# Patient Record
Sex: FEMALE | Race: WHITE | NOT HISPANIC OR LATINO | Employment: FULL TIME | URBAN - METROPOLITAN AREA
[De-identification: names, ages, dates, MRNs, and addresses within clinical notes are randomized per-mention and may not be internally consistent; named-entity substitution may affect disease eponyms.]

---

## 2017-01-04 NOTE — PROGRESS NOTES
SUBJECTIVE:                                                    Zamzam Willis is a 56 year old female who presents to clinic today for the following health issues:    Plugged ear      Duration: 2 weeks    Description (location/character/radiation): sinus congestion, headache, facial pressure, plugged right ear    Intensity:  moderate    Accompanying signs and symptoms: loss of hearing in right ear    History (similar episodes/previous evaluation): None    Precipitating or alleviating factors: None    Therapies tried and outcome: sudafed and ibuprofen    No fevers, mild cough, no short of breath or wheezing.   Couple years ago had sinusitis.     Problem list and histories reviewed & adjusted, as indicated.  Additional history: as documented    Patient Active Problem List   Diagnosis     Major depressive disorder, single episode     GERD (gastroesophageal reflux disease)     Anxiety     Seasonal affective disorder (H)     Vitamin D deficiency     CARDIOVASCULAR SCREENING; LDL GOAL LESS THAN 160     Folliculitis     SEVERE ZA (obstructive sleep apnea)     Past Surgical History   Procedure Laterality Date     C/section, low transverse       x 3     Tonsillectomy & adenoidectomy       as a child for snoring     Eye surgery  2011     bilateral catarract        Social History   Substance Use Topics     Smoking status: Never Smoker      Smokeless tobacco: Never Used      Comment: nonsmoking household     Alcohol Use: 0.0 oz/week     0 Standard drinks or equivalent per week      Comment: Rarely     Family History   Problem Relation Age of Onset     Arthritis Mother      Blood Disease Father      myelodysplastic syndrome     DIABETES Father      DIABETES Paternal Grandmother      CEREBROVASCULAR DISEASE Father      snored     CEREBROVASCULAR DISEASE Maternal Grandmother      CEREBROVASCULAR DISEASE Maternal Grandfather      CEREBROVASCULAR DISEASE Paternal Grandfather      Depression Mother      ZA     Depression Father       Asthma Sister      Asthma Mother          Current Outpatient Prescriptions   Medication Sig Dispense Refill     fluticasone (FLONASE) 50 MCG/ACT spray Spray 1-2 sprays into both nostrils daily 1 g 11     amoxicillin (AMOXIL) 500 MG capsule Take 2 capsules (1,000 mg) by mouth 3 times daily 60 capsule 0     escitalopram (LEXAPRO) 20 MG tablet Take 1 tablet (20 mg) by mouth daily 90 tablet 1     nystatin-triamcinolone (MYCOLOG II) cream Apply topically 2 times daily (Patient taking differently: Apply topically 2 times daily as needed ) 30 g 6     ORDER FOR DME Res Med S9 auto CPAP9-13 cm H2O, Grant Fx best medium       aspirin-acetaminophen-caffeine (EXCEDRIN MIGRAINE) 250-250-65 MG per tablet Take 1 tablet by mouth every 6 hours as needed for headaches 80 tablet      Ibuprofen (IBU PO) Take  by mouth as needed.       Acetaminophen (ACETAMIN PO) Take  by mouth as needed.       cholecalciferol (VITAMIN D) 1000 UNIT tablet Take 1 tablet by mouth daily.       CALCIUM 600+D 600-400 MG-UNIT OR TABS take one tablet twice a day 180 Tab 3     MULTIVITAMIN OR 1 TABLET DAILY       MAGNESIUM 500 MG OR CAPS 1 TABLET DAILY       Allergies   Allergen Reactions     Sulfa Drugs Hives     Problem list, Medication list, Allergies, and Medical/Social/Surgical histories reviewed in [facility] and updated as appropriate.    ROS:  Constitutional, HEENT, cardiovascular, pulmonary, gi and gu systems are negative, except as otherwise noted.    OBJECTIVE:                                                    /78 mmHg  Pulse 78  Temp(Src) 96.9  F (36.1  C) (Oral)  Wt 264 lb (119.75 kg)  SpO2 96%  LMP 10/12/2012  Body mass index is 43.25 kg/(m^2).  GENERAL: healthy, alert and no distress  EYES: Eyes grossly normal to inspection, PERRL and conjunctivae and sclerae normal  HENT: ear canals and TM's normal, nose and mouth without ulcers or lesions. posterior nasal drainage, nasal congestion,  Maxillary sinus tenderness.    NECK: no  adenopathy, no asymmetry, masses, or scars and thyroid normal to palpation  RESP: lungs clear to auscultation - no rales, rhonchi or wheezes  CV: regular rate and rhythm, normal S1 S2, no S3 or S4, no murmur, click or rub, no peripheral edema and peripheral pulses strong    Diagnostic Test Results:  none      ASSESSMENT/PLAN:                                                        ICD-10-CM    1. Other sinusitis J32.9 fluticasone (FLONASE) 50 MCG/ACT spray     amoxicillin (AMOXIL) 500 MG capsule   2. ETD (eustachian tube dysfunction), right H69.81    Warning signs discussed.  side effects discussed  Symptomatic treatment: such as fluids,  OTC acetaminophen and /or non-steroidal anti-inflammatory medication.  Follow up  1-2 wks as needed     Indra Lewis PA-C  [facility]

## 2017-01-05 ENCOUNTER — OFFICE VISIT (OUTPATIENT)
Dept: FAMILY MEDICINE | Facility: CLINIC | Age: 57
End: 2017-01-05
Payer: COMMERCIAL

## 2017-01-05 VITALS
BODY MASS INDEX: 43.25 KG/M2 | TEMPERATURE: 96.9 F | WEIGHT: 264 LBS | HEART RATE: 78 BPM | SYSTOLIC BLOOD PRESSURE: 113 MMHG | DIASTOLIC BLOOD PRESSURE: 78 MMHG | OXYGEN SATURATION: 96 %

## 2017-01-05 DIAGNOSIS — H69.91 ETD (EUSTACHIAN TUBE DYSFUNCTION), RIGHT: ICD-10-CM

## 2017-01-05 DIAGNOSIS — J32.9 OTHER SINUSITIS: Primary | ICD-10-CM

## 2017-01-05 PROCEDURE — 99213 OFFICE O/P EST LOW 20 MIN: CPT | Performed by: PHYSICIAN ASSISTANT

## 2017-01-05 RX ORDER — FLUTICASONE PROPIONATE 50 MCG
1-2 SPRAY, SUSPENSION (ML) NASAL DAILY
Qty: 1 G | Refills: 11 | Status: SHIPPED | OUTPATIENT
Start: 2017-01-05 | End: 2023-01-04

## 2017-01-05 RX ORDER — AMOXICILLIN 500 MG/1
1000 CAPSULE ORAL 3 TIMES DAILY
Qty: 60 CAPSULE | Refills: 0 | Status: SHIPPED | OUTPATIENT
Start: 2017-01-05 | End: 2017-03-05

## 2017-01-05 NOTE — PATIENT INSTRUCTIONS
Sinusitis           What is sinusitis?   Sinusitis is swollen, infected linings of the sinuses. The sinuses are hollow spaces in the bones of your face and skull. They connect with the nose through small openings. Like the nose, their linings make mucus.   How does it occur?   Sinusitis occurs when the sinus linings become infected. The passageways from the sinuses to the nose are very narrow. Swelling and mucus may block the passageways. This leads to pressure changes in the sinuses that can be painful.   A number of things can cause swelling and sinusitis. Most often it's allergens (things that cause allergies, like pollen and mold) and viruses, such as viruses that cause the common cold. Whether the cause is allergies or a virus, the sinus linings can swell. When swelling causes the sinus passageway to swell shut, bacteria, viruses, and even fungus can be trapped in the sinuses and cause a sinus infection.   If your nasal bones have been injured or are deformed, causing partial blockage of the sinus openings, you are more likely to get sinusitis.   What are the symptoms?   Symptoms include:   feeling of fullness or pressure in your head   a headache that is most painful when you first wake up in the morning or when you bend your head down or forward   pain above or below your eyes   aching in the upper jaw and teeth   runny or stuffy nose   cough, especially at night   fluid draining down the back of your throat (postnasal drainage)   sore throat in the morning or evening.   How is it diagnosed?   Your healthcare provider will ask about your symptoms and will examine you. You may have an X-ray to look for swelling, fluid, or small benign growths (polyps) in the sinuses.   How is it treated?   Decongestants may help. They may be nonprescription or prescription. They are available as liquids, pills, and nose sprays.   Your healthcare provider may prescribe an antibiotic. In some cases you may need to  take decongestants and antibiotics for several weeks.   You may need nonprescription medicine for pain, such as acetaminophen or ibuprofen. Check with your healthcare provider before you give any medicine that contains aspirin or salicylates to a child or teen. This includes medicines like baby aspirin, some cold medicines, and Pepto Bismol. Children and teens who take aspirin are at risk for a serious illness called Reye's syndrome. Ibuprofen is an NSAID. Nonsteroidal anti-inflammatory medicines (NSAIDs) may cause stomach bleeding and other problems. These risks increase with age. Read the label and take as directed. Unless recommended by your healthcare provider, do not take NSAIDs for more than 10 days for any reason.   If you have chronic or repeated sinus infections, allergies may be the cause. Your healthcare provider may prescribe antihistamine tablets or prescription nasal sprays (steroids or cromolyn) to treat the allergies.   If you have chronic, severe sinusitis that does not respond to treatment with medicines, surgery may be done. The surgeon can create an extra or enlarged passageway in the wall of the sinus cavity. This allows the sinuses to drain more easily through the nasal passages. This should help them stay free of infection.   How long will the effects last?   Symptoms may get better gradually over 3 to 10 days. Depending on what caused the sinusitis and how severe it is, it may last for days or weeks. The symptoms may come back if you do not finish all of your antibiotic.   How can I take care of myself?   Follow your healthcare provider's instructions.   If you are taking an antibiotic, take all of it as directed by your provider. If you stop taking the medicine when your symptoms are gone but before you have taken all of the medicine, symptoms may come back.   Avoid tobacco smoke.   If you have allergies, take care to avoid the things you are allergic to, such as animal dander.   Add  moisture to the air with a humidifier or a vaporizer, unless you have mold allergy (mold may grow in your vaporizer).   Inhale steam from a basin of hot water or shower to help open your sinuses and relieve pain.   Use saline nasal sprays to help wash out nasal passages and clear some mucus from the airways.   Use decongestants as directed on the label or by your provider.   If you are using a nonprescription nasal-spray decongestant, generally you should not use it for more than 3 days. After 3 days it may cause your symptoms to get worse. Ask your healthcare provider if it is OK for you to use a nasal spray decongestant longer than this.   Get plenty of rest.   Drink more fluids to keep the mucus as thin as possible so your sinuses can drain more easily.   Put warm compresses on painful areas.   Take antibiotics as prescribed. Use all of the medicine, even after you feel better. Some sinus infections require 2 to 4 weeks of antibiotic treatment.   See your healthcare provider if the pain lasts for several days or gets worse.   If the sinus areas above or below your eyes are swollen or bulging, see your healthcare provider right away. This symptom may mean that the infection is spreading. A spreading infection can affect other parts of your body--even the brain--and needs to be treated promptly.   How can I help prevent sinusitis?   Treat your colds and allergies promptly. Use decongestants as soon as you start having symptoms.   Do not smoke and stay away from secondhand smoke.   Drink lots of fluids to keep the mucus thin.   Humidify your home if the air is particularly dry.   If you have sinus infections often, consider having allergy tests.   If sinusitis continues to be a problem despite treatment, you might need an exam by an ear, nose, and throat doctor (called an ENT or otolaryngologist). The specialist will check for polyps or a deformed bone that may be blocking your sinuses.     Published by  [employer].  This content is reviewed periodically and is subject to change as new health information becomes available. The information is intended to inform and educate and is not a replacement for medical evaluation, advice, diagnosis or treatment by a healthcare professional.   Developed by [employer].   ? 2010 [facility] and/or its affiliates. All Rights Reserved.   Copyright   [employer] 2011  Adult Health Advisor

## 2017-01-05 NOTE — NURSING NOTE
"Chief Complaint   Patient presents with     Ear Problem       Initial /78 mmHg  Pulse 78  Temp(Src) 96.9  F (36.1  C) (Oral)  Wt 264 lb (119.75 kg)  SpO2 96%  LMP 10/12/2012 Estimated body mass index is 43.25 kg/(m^2) as calculated from the following:    Height as of 11/2/16: 5' 5.5\" (1.664 m).    Weight as of this encounter: 264 lb (119.75 kg).  BP completed using cuff size: luzmaria Sauer CMA      "

## 2017-01-05 NOTE — MR AVS SNAPSHOT
After Visit Summary   1/5/2017    Zamzam Willis    MRN: [id number]           Patient Information     Date Of Birth          1960        Visit Information        Provider Department      1/5/2017 7:00 AM Indra Lewis PA-C [facility]        Today's Diagnoses     Other sinusitis    -  1     ETD (eustachian tube dysfunction), right           Care Instructions                  Sinusitis           What is sinusitis?   Sinusitis is swollen, infected linings of the sinuses. The sinuses are hollow spaces in the bones of your face and skull. They connect with the nose through small openings. Like the nose, their linings make mucus.   How does it occur?   Sinusitis occurs when the sinus linings become infected. The passageways from the sinuses to the nose are very narrow. Swelling and mucus may block the passageways. This leads to pressure changes in the sinuses that can be painful.   A number of things can cause swelling and sinusitis. Most often it's allergens (things that cause allergies, like pollen and mold) and viruses, such as viruses that cause the common cold. Whether the cause is allergies or a virus, the sinus linings can swell. When swelling causes the sinus passageway to swell shut, bacteria, viruses, and even fungus can be trapped in the sinuses and cause a sinus infection.   If your nasal bones have been injured or are deformed, causing partial blockage of the sinus openings, you are more likely to get sinusitis.   What are the symptoms?   Symptoms include:   feeling of fullness or pressure in your head   a headache that is most painful when you first wake up in the morning or when you bend your head down or forward   pain above or below your eyes   aching in the upper jaw and teeth   runny or stuffy nose   cough, especially at night   fluid draining down the back of your throat (postnasal drainage)   sore throat in the morning or evening.   How is it diagnosed?   Your  healthcare provider will ask about your symptoms and will examine you. You may have an X-ray to look for swelling, fluid, or small benign growths (polyps) in the sinuses.   How is it treated?   Decongestants may help. They may be nonprescription or prescription. They are available as liquids, pills, and nose sprays.   Your healthcare provider may prescribe an antibiotic. In some cases you may need to take decongestants and antibiotics for several weeks.   You may need nonprescription medicine for pain, such as acetaminophen or ibuprofen. Check with your healthcare provider before you give any medicine that contains aspirin or salicylates to a child or teen. This includes medicines like baby aspirin, some cold medicines, and Pepto Bismol. Children and teens who take aspirin are at risk for a serious illness called Reye's syndrome. Ibuprofen is an NSAID. Nonsteroidal anti-inflammatory medicines (NSAIDs) may cause stomach bleeding and other problems. These risks increase with age. Read the label and take as directed. Unless recommended by your healthcare provider, do not take NSAIDs for more than 10 days for any reason.   If you have chronic or repeated sinus infections, allergies may be the cause. Your healthcare provider may prescribe antihistamine tablets or prescription nasal sprays (steroids or cromolyn) to treat the allergies.   If you have chronic, severe sinusitis that does not respond to treatment with medicines, surgery may be done. The surgeon can create an extra or enlarged passageway in the wall of the sinus cavity. This allows the sinuses to drain more easily through the nasal passages. This should help them stay free of infection.   How long will the effects last?   Symptoms may get better gradually over 3 to 10 days. Depending on what caused the sinusitis and how severe it is, it may last for days or weeks. The symptoms may come back if you do not finish all of your antibiotic.   How can I take care of  myself?   Follow your healthcare provider's instructions.   If you are taking an antibiotic, take all of it as directed by your provider. If you stop taking the medicine when your symptoms are gone but before you have taken all of the medicine, symptoms may come back.   Avoid tobacco smoke.   If you have allergies, take care to avoid the things you are allergic to, such as animal dander.   Add moisture to the air with a humidifier or a vaporizer, unless you have mold allergy (mold may grow in your vaporizer).   Inhale steam from a basin of hot water or shower to help open your sinuses and relieve pain.   Use saline nasal sprays to help wash out nasal passages and clear some mucus from the airways.   Use decongestants as directed on the label or by your provider.   If you are using a nonprescription nasal-spray decongestant, generally you should not use it for more than 3 days. After 3 days it may cause your symptoms to get worse. Ask your healthcare provider if it is OK for you to use a nasal spray decongestant longer than this.   Get plenty of rest.   Drink more fluids to keep the mucus as thin as possible so your sinuses can drain more easily.   Put warm compresses on painful areas.   Take antibiotics as prescribed. Use all of the medicine, even after you feel better. Some sinus infections require 2 to 4 weeks of antibiotic treatment.   See your healthcare provider if the pain lasts for several days or gets worse.   If the sinus areas above or below your eyes are swollen or bulging, see your healthcare provider right away. This symptom may mean that the infection is spreading. A spreading infection can affect other parts of your body--even the brain--and needs to be treated promptly.   How can I help prevent sinusitis?   Treat your colds and allergies promptly. Use decongestants as soon as you start having symptoms.   Do not smoke and stay away from secondhand smoke.   Drink lots of fluids to keep the mucus thin.    Humidify your home if the air is particularly dry.   If you have sinus infections often, consider having allergy tests.   If sinusitis continues to be a problem despite treatment, you might need an exam by an ear, nose, and throat doctor (called an ENT or otolaryngologist). The specialist will check for polyps or a deformed bone that may be blocking your sinuses.     Published by [employer].  This content is reviewed periodically and is subject to change as new health information becomes available. The information is intended to inform and educate and is not a replacement for medical evaluation, advice, diagnosis or treatment by a healthcare professional.   Developed by [employer].   ? 2010 [employer] and/or its affiliates. All Rights Reserved.   Copyright   [employer] 2011  Adult Health Advisor              Follow-ups after your visit        Who to contact     If you have questions or need follow up information about today's clinic visit or your schedule please contact [facility] directly at [phone].  Normal or non-critical lab and imaging results will be communicated to you by [employer], letter or phone within 4 business days after the clinic has received the results. If you do not hear from us within 7 days, please contact the clinic through [employer] or phone. If you have a critical or abnormal lab result, we will notify you by phone as soon as possible.  Submit refill requests through [employer] or call your pharmacy and they will forward the refill request to us. Please allow 3 business days for your refill to be completed.          Additional Information About Your Visit        [employer] Information     [employer] gives you secure access to your electronic health record. If you see a primary care provider, you can also send messages to your care team and make appointments. If you have questions, please call your primary care clinic.  If you do not have a primary care provider,  please call [phone] and they will assist you.        Care EveryWhere ID     This is your Care EveryWhere ID. This could be used by other organizations to access your [place] medical records  [id number]        Your Vitals Were     Pulse Temperature Pulse Oximetry Last Period          78 96.9  F (36.1  C) (Oral) 96% 10/12/2012         Blood Pressure from Last 3 Encounters:   01/05/17 113/78   11/02/16 113/72   09/04/15 118/82    Weight from Last 3 Encounters:   01/05/17 264 lb (119.75 kg)   11/02/16 263 lb (119.296 kg)   09/04/15 263 lb (119.296 kg)              Today, you had the following     No orders found for display         Today's Medication Changes          These changes are accurate as of: 1/5/17  7:24 AM.  If you have any questions, ask your nurse or doctor.               Start taking these medicines.        Dose/Directions    amoxicillin 500 MG capsule   Commonly known as:  AMOXIL   Used for:  Other sinusitis   Started by:  Indra Lewis PA-C        Dose:  1000 mg   Take 2 capsules (1,000 mg) by mouth 3 times daily   Quantity:  60 capsule   Refills:  0       fluticasone 50 MCG/ACT spray   Commonly known as:  FLONASE   Used for:  Other sinusitis   Started by:  Indra Lewis PA-C        Dose:  1-2 spray   Spray 1-2 sprays into both nostrils daily   Quantity:  1 g   Refills:  11         These medicines have changed or have updated prescriptions.        Dose/Directions    nystatin-triamcinolone cream   Commonly known as:  MYCOLOG II   This may have changed:    - when to take this  - reasons to take this   Used for:  Intertriginous candidiasis        Apply topically 2 times daily   Quantity:  30 g   Refills:  6            Where to get your medicines      These medications were sent to [facility] [address] - [address] [facility] AT [address]  [address] [facility] [address]     Phone:  [phone]    - amoxicillin 500 MG capsule  - fluticasone  50 MCG/ACT spray             Primary Care Provider Office Phone # Fax #    Naida Sulema Garrett [phone]       [facility] [address] [facility] [address]        Thank you!     Thank you for choosing [facility]  for your care. Our goal is always to provide you with excellent care. Hearing back from our patients is one way we can continue to improve our services. Please take a few minutes to complete the written survey that you may receive in the mail after your visit with us. Thank you!             Your Updated Medication List - Protect others around you: Learn how to safely use, store and throw away your medicines at www.disposemymeds.org.          This list is accurate as of: 1/5/17  7:24 AM.  Always use your most recent med list.                   Brand Name Dispense Instructions for use    ACETAMIN PO      Take  by mouth as needed.       amoxicillin 500 MG capsule    AMOXIL    60 capsule    Take 2 capsules (1,000 mg) by mouth 3 times daily       aspirin-acetaminophen-caffeine 250-250-65 MG per tablet    EXCEDRIN MIGRAINE    80 tablet    Take 1 tablet by mouth every 6 hours as needed for headaches       calcium 600 + D 600-400 MG-UNIT per tablet   Generic drug:  calcium-vitamin D     180 Tab    take one tablet twice a day       cholecalciferol 1000 UNIT tablet    vitamin D     Take 1 tablet by mouth daily.       escitalopram 20 MG tablet    LEXAPRO    90 tablet    Take 1 tablet (20 mg) by mouth daily       fluticasone 50 MCG/ACT spray    FLONASE    1 g    Spray 1-2 sprays into both nostrils daily       IBU PO      Take  by mouth as needed.       Magnesium 500 MG Caps      1 TABLET DAILY       MULTIVITAMIN PO      1 TABLET DAILY       nystatin-triamcinolone cream    MYCOLOG II    30 g    Apply topically 2 times daily       order for DME      Res Med S9 auto CPAP9-13 cm H2O, [place] Fx best medium

## 2017-02-01 ENCOUNTER — E-VISIT (OUTPATIENT)
Dept: FAMILY MEDICINE | Facility: CLINIC | Age: 57
End: 2017-02-01
Payer: COMMERCIAL

## 2017-02-01 DIAGNOSIS — J32.9 OTHER SINUSITIS: Primary | ICD-10-CM

## 2017-02-01 PROCEDURE — 99444 ZZC PHYSICIAN ONLINE EVALUATION & MANAGEMENT SERVICE: CPT | Performed by: PHYSICIAN ASSISTANT

## 2017-03-05 ENCOUNTER — OFFICE VISIT (OUTPATIENT)
Dept: URGENT CARE | Facility: URGENT CARE | Age: 57
End: 2017-03-05
Payer: COMMERCIAL

## 2017-03-05 VITALS
TEMPERATURE: 98.3 F | DIASTOLIC BLOOD PRESSURE: 76 MMHG | OXYGEN SATURATION: 96 % | HEART RATE: 84 BPM | BODY MASS INDEX: 43.1 KG/M2 | SYSTOLIC BLOOD PRESSURE: 115 MMHG | RESPIRATION RATE: 16 BRPM | WEIGHT: 263 LBS

## 2017-03-05 DIAGNOSIS — J32.9 CHRONIC SINUSITIS, UNSPECIFIED LOCATION: ICD-10-CM

## 2017-03-05 DIAGNOSIS — H65.191 OTHER ACUTE NONSUPPURATIVE OTITIS MEDIA OF RIGHT EAR, RECURRENCE NOT SPECIFIED: Primary | ICD-10-CM

## 2017-03-05 PROCEDURE — 99213 OFFICE O/P EST LOW 20 MIN: CPT | Performed by: FAMILY MEDICINE

## 2017-03-05 RX ORDER — CEFDINIR 300 MG/1
300 CAPSULE ORAL 2 TIMES DAILY
Qty: 20 CAPSULE | Refills: 0 | Status: SHIPPED | OUTPATIENT
Start: 2017-03-05 | End: 2017-03-15

## 2017-03-05 ASSESSMENT — PAIN SCALES - GENERAL: PAINLEVEL: SEVERE PAIN (6)

## 2017-03-05 NOTE — PROGRESS NOTES
SUBJECTIVE:                                                    Zamzam Willis is a 56 year old female who presents to clinic today for the following health issues:      RESPIRATORY SYMPTOMS      Duration: 10 days    Description  nasal congestion, facial pain/pressure, cough, ear pain bilateral and headache    Severity: severe    Accompanying signs and symptoms: None    History (predisposing factors):  none    Precipitating or alleviating factors: None    Therapies tried and outcome: over the counter meds      Has been sick since [place]  Has been on 2 different antibiotic: initially amoxicillin then augmentin last one was in 2/1/17 was augmentin.   Saw ENT was told she had eustachian tube problem was placed on prednisone and is off that now  10 days ago hearing started to lose again like before feeling plugged bilateral side head and sinuses congested.  Having constant postnasal drainage. Cough and greenish nasal discharge   Mouth also feels discomfort and has not taste.   No fevers or chills chest pain or shortness of breath   No rash  Ill-contacts: unsure  Because of persistent and worsening symptoms came in to be seen    Problem list and histories reviewed & adjusted, as indicated.  Additional history: as documented    Problem list, Medication list, Allergies, and Medical/Social/Surgical histories reviewed in [facility] and updated as appropriate.    ROS:  Constitutional, HEENT, cardiovascular, pulmonary, gi and gu systems are negative, except as otherwise noted.    OBJECTIVE:                                                    /76  Pulse 84  Temp 98.3  F (36.8  C) (Oral)  Resp 16  Wt 263 lb (119.3 kg)  LMP 10/12/2012  SpO2 96%  Breastfeeding? No  BMI 43.1 kg/m2  Body mass index is 43.1 kg/(m^2).  GENERAL: healthy, alert and no distress  EYES: pink palpebral conjunctiva, anicteric sclera, pupils equally reactive to light and accomodation, extraocular muscles intact full and equal.  ENT: midline nasal septum,  positive  nasal congestion   Left ear:no tragal tenderness, no mastoid tenderness normal tympaninc membrane   Right ear: no tragal tenderness, no mastoid tenderness erythematous and bulging tympaninc membrane   NECK: no adenopathy, no asymmetry or  masses  RESP: lungs clear to auscultation - no rales, rhonchi or wheezes  CV: regular rate and rhythm, normal S1 S2, no S3 or S4, no murmur, click or rub, no peripheral edema and peripheral pulses strong  ABDOMEN: soft, nontender, no hepatosplenomegaly, no masses and bowel sounds normal  MS: no gross musculoskeletal defects noted, no edema  NEURO: Normal strength and tone, mentation intact and speech normal    Diagnostic Test Results:  No results found for this or any previous visit (from the past 24 hour(s)).     ASSESSMENT/PLAN:                                                        ICD-10-CM    1. Other acute nonsuppurative otitis media of right ear, recurrence not specified H65.191 cefdinir (OMNICEF) 300 MG capsule   2. Chronic sinusitis, unspecified location J32.9        Prescribed with omnicef  Follow up with primary care provider for chronic sinusitis. Recommended flonase.   Recommend follow up with primary care provider if no relief , sooner if worse  Needs ear recheck with primary care provider in 2-4 weeks  Adverse reactions of medications discussed.  Over the counter medications discussed.   Aware to come back in if with worsening symptoms or if no relief despite treatment plan  Patient voiced understanding and had no further questions.     MD Sherly Vital MD  [facility]

## 2017-03-05 NOTE — MR AVS SNAPSHOT
After Visit Summary   3/5/2017    Zamzam Willis    MRN: [id number]           Patient Information     Date Of Birth          1960        Visit Information        Provider Department      3/5/2017 10:15 AM Sherly Barbour MD [facility]        Today's Diagnoses     Other acute nonsuppurative otitis media of right ear, recurrence not specified    -  1    Chronic sinusitis, unspecified location           Follow-ups after your visit        Who to contact     If you have questions or need follow up information about today's clinic visit or your schedule please contact [facility] directly at [phone].  Normal or non-critical lab and imaging results will be communicated to you by MyChart, letter or phone within 4 business days after the clinic has received the results. If you do not hear from us within 7 days, please contact the clinic through [employer] or phone. If you have a critical or abnormal lab result, we will notify you by phone as soon as possible.  Submit refill requests through [employer] or call your pharmacy and they will forward the refill request to us. Please allow 3 business days for your refill to be completed.          Additional Information About Your Visit        MyChart Information     [employer] gives you secure access to your electronic health record. If you see a primary care provider, you can also send messages to your care team and make appointments. If you have questions, please call your primary care clinic.  If you do not have a primary care provider, please call [phone] and they will assist you.        Care EveryWhere ID     This is your Care EveryWhere ID. This could be used by other organizations to access your [facility] medical records  [id number]        Your Vitals Were     Pulse Temperature Respirations Last Period Pulse Oximetry Breastfeeding?    84 98.3  F (36.8  C) (Oral) 16 10/12/2012 96% No    BMI (Body Mass Index)                    43.1 kg/m2            Blood Pressure from Last 3 Encounters:   03/05/17 115/76   01/05/17 113/78   11/02/16 113/72    Weight from Last 3 Encounters:   03/05/17 263 lb (119.3 kg)   01/05/17 264 lb (119.7 kg)   11/02/16 263 lb (119.3 kg)              Today, you had the following     No orders found for display         Today's Medication Changes          These changes are accurate as of: 3/5/17  9:30 PM.  If you have any questions, ask your nurse or doctor.               Start taking these medicines.        Dose/Directions    cefdinir 300 MG capsule   Commonly known as:  OMNICEF   Used for:  Other acute nonsuppurative otitis media of right ear, recurrence not specified   Started by:  Sherly Barbour MD        Dose:  300 mg   Take 1 capsule (300 mg) by mouth 2 times daily for 10 days   Quantity:  20 capsule   Refills:  0            Where to get your medicines      These medications were sent to [id number] - [place] [address] [place] AT [address] [place], [place] [facility] [place] [address]     Phone:  [phone]     cefdinir 300 MG capsule                Primary Care Provider Office Phone # Fax #    Naida Garrett [phone]       [facility] [address] [facility] [address]        Thank you!     Thank you for choosing [facility]  for your care. Our goal is always to provide you with excellent care. Hearing back from our patients is one way we can continue to improve our services. Please take a few minutes to complete the written survey that you may receive in the mail after your visit with us. Thank you!             Your Updated Medication List - Protect others around you: Learn how to safely use, store and throw away your medicines at www.disposemymeds.org.          This list is accurate as of: 3/5/17  9:30 PM.  Always use your most recent med list.                   Brand Name Dispense  Instructions for use    ACETAMIN PO      Take  by mouth as needed.       aspirin-acetaminophen-caffeine 250-250-65 MG per tablet    EXCEDRIN MIGRAINE    80 tablet    Take 1 tablet by mouth every 6 hours as needed for headaches       calcium 600 + D 600-400 MG-UNIT per tablet   Generic drug:  calcium-vitamin D     180 Tab    take one tablet twice a day       cefdinir 300 MG capsule    OMNICEF    20 capsule    Take 1 capsule (300 mg) by mouth 2 times daily for 10 days       cholecalciferol 1000 UNIT tablet    vitamin D     Take 1 tablet by mouth daily.       escitalopram 20 MG tablet    LEXAPRO    90 tablet    Take 1 tablet (20 mg) by mouth daily       fluticasone 50 MCG/ACT spray    FLONASE    1 g    Spray 1-2 sprays into both nostrils daily       IBU PO      Take  by mouth as needed.       Magnesium 500 MG Caps      1 TABLET DAILY       MULTIVITAMIN PO      1 TABLET DAILY       omeprazole 20 MG CR capsule    priLOSEC    90 capsule    Take 1 capsule (20 mg) by mouth daily       order for DME      Res Med S9 auto CPAP9-13 cm H2O, [place] Fx best medium

## 2017-04-25 NOTE — PROGRESS NOTES
SUBJECTIVE:     CC: Zamzam Willis is an 56 year old woman who presents for preventive health visit.     Healthy Habits:    Do you get at least three servings of calcium containing foods daily (dairy, green leafy vegetables, etc.)? yes    Amount of exercise or daily activities, outside of work: 1 day(s) per week    Problems taking medications regularly No    Medication side effects: No    Have you had an eye exam in the past two years? yes    Do you see a dentist twice per year? no    Do you have sleep apnea, excessive snoring or daytime drowsiness?has CPAP     Feeling a little more down lately. Not sure if this is due to winter months or getting a different generic Lexapro (changed  in November).    Rash in groin - she has had this before and was given a topical antifungal in the past. She describes it as being red and burning. She is requesting a refill of the antifungal cream.    Skin tags about 20 on neck - some of the bigger ones catch on her clothes, but overall not bothersome.    She also got a bilateral myringotomy back in March due to hearing problems from fluid. She has had no further problems and reports her hearing is back to normal.    Today's PHQ-2 Score:   PHQ-2 ( 1999 Pfizer) 4/29/2017 9/4/2015   Q1: Little interest or pleasure in doing things - 0   Q2: Feeling down, depressed or hopeless - 0   PHQ-2 Score - 0   Little interest or pleasure in doing things Several days -   Feeling down, depressed or hopeless Several days -   PHQ-2 Score 2 -       Abuse: Current or Past(Physical, Sexual or Emotional)- No  Do you feel safe in your environment - Yes    Social History   Substance Use Topics     Smoking status: Never Smoker     Smokeless tobacco: Never Used      Comment: nonsmoking household     Alcohol use 0.0 oz/week      Comment: Rarely     The patient does not drink >3 drinks per day nor >7 drinks per week.      Reviewed orders with patient.  Reviewed health maintenance and updated  orders accordingly - Yes    G 3 P 3   Patient's last menstrual period was 10/12/2012.     Fasting: No   Td: tdap 3/10       Last 3 Pap Results:   PAP (no units)   Date Value   06/10/2014 NIL   2010 NIL        HPV: unknown          Cholesterol:   Recent Labs   Lab Test  14   0939  03/03/10   0956   CHOL  155  149   HDL  29*  28*   LDL  110  108   TRIG  80  66   CHOLHDLRATIO  5.4*  5.4*       MM/16  Dexa:  NA     Flex/colo: FIT       Seat Belt: Yes    Sunscreen use: Yes   Calcium Intake: 3-4 servings daily + supplement   Health Care Directive: No  Sexually Active: Yes     Current contraception: vasectomy  History of abnormal Pap smear: No  Family history of colon/breast/ovarian cancer: No  Regular self breast exam: Yes  History of abnormal mammogram: Yes: f/u benign      Mammo Decision Support:  Patient over age 50, mutual decision to screen reflected in health maintenance.    Pertinent mammograms are reviewed under the imaging tab.      Reviewed and updated as needed this visit by clinical staff  Tobacco  Allergies  Meds         Reviewed and updated as needed this visit by Provider            ROS:  C: NEGATIVE for fever, chills, change in weight  I: NEGATIVE for worrisome rashes, moles or lesions  E: NEGATIVE for vision changes or irritation  ENT: NEGATIVE for ear, mouth and throat problems  R: NEGATIVE for significant cough or SOB  B: NEGATIVE for masses, tenderness or discharge  CV: NEGATIVE for chest pain, palpitations or peripheral edema  GI: NEGATIVE for nausea, abdominal pain, heartburn, or change in bowel habits  : NEGATIVE for unusual urinary or vaginal symptoms. No vaginal bleeding.  M: NEGATIVE for significant arthralgias or myalgia  N: NEGATIVE for weakness, dizziness or paresthesias  P: NEGATIVE for changes in mood or affect     Problem list, Medication list, Allergies, and Medical/Social/Surgical histories reviewed in [facility] and updated as appropriate.  Labs reviewed in EPIC  BP  Readings from Last 3 Encounters:   05/02/17 112/75   03/05/17 115/76   01/05/17 113/78    Wt Readings from Last 3 Encounters:   05/02/17 265 lb (120.2 kg)   03/05/17 263 lb (119.3 kg)   01/05/17 264 lb (119.7 kg)                  Patient Active Problem List   Diagnosis     Major depressive disorder, single episode     GERD (gastroesophageal reflux disease)     Anxiety     Seasonal affective disorder (H)     Vitamin D deficiency     CARDIOVASCULAR SCREENING; LDL GOAL LESS THAN 160     Folliculitis     SEVERE ZA (obstructive sleep apnea)     Past Surgical History:   Procedure Laterality Date     ABDOMEN SURGERY  1981,1983.1985    3 c-sections     BIOPSY      On chest area     C/SECTION, LOW TRANSVERSE      x 3     EYE SURGERY  2011    bilateral catarract      TONSILLECTOMY & ADENOIDECTOMY      as a child for snoring       Social History   Substance Use Topics     Smoking status: Never Smoker     Smokeless tobacco: Never Used      Comment: nonsmoking household     Alcohol use 0.0 oz/week      Comment: Rarely     Family History   Problem Relation Age of Onset     Arthritis Mother      Depression Mother      ZA     Asthma Mother      Anxiety Disorder Mother      Blood Disease Father      myelodysplastic syndrome     DIABETES Father      CEREBROVASCULAR DISEASE Father      snored     Depression Father      DIABETES Paternal Grandmother      CEREBROVASCULAR DISEASE Maternal Grandmother      CEREBROVASCULAR DISEASE Maternal Grandfather      CEREBROVASCULAR DISEASE Paternal Grandfather      Asthma Sister          Current Outpatient Prescriptions   Medication Sig Dispense Refill     escitalopram (LEXAPRO) 20 MG tablet Take 1 tablet (20 mg) by mouth daily 90 tablet 1     omeprazole (PRILOSEC) 20 MG CR capsule Take 1 capsule (20 mg) by mouth daily 90 capsule 3     nystatin-triamcinolone (MYCOLOG II) cream Apply topically 2 times daily as needed 60 g 1     fluticasone (FLONASE) 50 MCG/ACT spray Spray 1-2 sprays into both  "nostrils daily 1 g 11     ORDER FOR DME Res Med S9 auto CPAP9-13 cm H2O, [place] Fx best medium       aspirin-acetaminophen-caffeine (EXCEDRIN MIGRAINE) 250-250-65 MG per tablet Take 1 tablet by mouth every 6 hours as needed for headaches 80 tablet      Ibuprofen (IBU PO) Take  by mouth as needed.       Acetaminophen (ACETAMIN PO) Take  by mouth as needed.       cholecalciferol (VITAMIN D) 1000 UNIT tablet Take 1 tablet by mouth daily.       CALCIUM 600+D 600-400 MG-UNIT OR TABS take one tablet twice a day 180 Tab 3     MULTIVITAMIN OR 1 TABLET DAILY       MAGNESIUM 500 MG OR CAPS 1 TABLET DAILY       OBJECTIVE:     /75  Pulse 75  Temp 97.8  F (36.6  C) (Oral)  Ht 5' 5.35\" (1.66 m)  Wt 265 lb (120.2 kg)  LMP 10/12/2012  BMI 43.62 kg/m2  EXAM:  GENERAL APPEARANCE: healthy, alert and no distress  EYES: Eyes grossly normal to inspection, PERRL and conjunctivae and sclerae normal  HENT: Right TM has small perforation still evident, appears to be healing. Left TM has healed. Ear canals normal, nose and mouth without ulcers or lesions, oropharynx clear and oral mucous membranes moist  NECK: no adenopathy, no asymmetry, masses, or scars and thyroid normal to palpation  RESP: lungs clear to auscultation - no rales, rhonchi or wheezes  BREAST: normal without masses, tenderness or nipple discharge and no palpable axillary masses or adenopathy  CV: regular rate and rhythm, normal S1 S2, no S3 or S4, no murmur, click or rub, no peripheral edema and peripheral pulses strong  ABDOMEN: soft, nontender, no hepatosplenomegaly, no masses and bowel sounds normal   (female): normal female external genitalia, normal urethral meatus, vaginal mucosal atrophy noted, normal cervix, adnexae, and uterus without masses or abnormal discharge  MS: no musculoskeletal defects are noted and gait is age appropriate without ataxia  SKIN: Several small skin tags around neck and chest. Single seborrheic keratosis under left breast.  NEURO: " Normal strength and tone, sensory exam grossly normal, mentation intact and speech normal  PSYCH: mentation appears normal and affect normal/bright    ASSESSMENT/PLAN:     (Z00.00) Routine general medical examination at a health care facility  (primary encounter diagnosis)  Comment: Doing well  Plan: Pap imaged thin layer screen with HPV -         recommended age 30 - 65 years (select HPV order        below), HPV High Risk Types DNA Cervical    (F39) Seasonal affective disorder (H)  Comment: Stable  Plan: escitalopram (LEXAPRO) 20 MG tablet        See below    (F32.5) Major depressive disorder with single episode, in full remission (H)  Comment: Stable  Plan: escitalopram (LEXAPRO) 20 MG tablet        Patient has been feeling a little more down lately. It is uncertain if this is due to a  change to her Lexapro in November or the winter season. The patient is comfortable waiting to see if her mood improves with the nicer weather. She was also informed that she can check with her pharmacy and ask to be given the previous medication that she was on in October.    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    (K21.9) Gastroesophageal reflux disease without esophagitis  Comment: Stable  Plan: omeprazole (PRILOSEC) 20 MG CR capsule        Patient has been using the omeprazole OTC almost every day to control her GERD. Advised that she begin to cut back to every other day as tolerated to avoid long term adverse effects and also implement more lifestyle changes (handout provided).    (Z11.9) Screening examination for infectious disease  Comment: Due  Plan: Hepatitis C antibody        Patient will return to clinic for blood draw when she has more time.      COUNSELING:   Reviewed preventive health counseling, as reflected in patient instructions  Special attention given to:         "Regular exercise       Healthy diet/nutrition         reports that she has never smoked. She has never used smokeless tobacco.    Estimated body mass index is 43.62 kg/(m^2) as calculated from the following:    Height as of this encounter: 5' 5.35\" (1.66 m).    Weight as of this encounter: 265 lb (120.2 kg).   Weight management plan: Discussed healthy diet and exercise guidelines and patient will follow up in 6 months in clinic to re-evaluate.    Counseling Resources:  ATP IV Guidelines  Pooled Cohorts Equation Calculator  Breast Cancer Risk Calculator  FRAX Risk Assessment  ICSI Preventive Guidelines  Dietary Guidelines for Americans, 2010  USDA's MyPlate  ASA Prophylaxis  Lung CA Screening    Naida Garrett MD  [facility]  "

## 2017-04-25 NOTE — PATIENT INSTRUCTIONS
Heartburn/GERD   What is heartburn?   Heartburn refers to the symptoms you feel when acids in your stomach flow back into the esophagus. (The esophagus is the tube that carries food from your throat to your stomach.) This backward movement of stomach acid is called reflux. The acid can burn and irritate the esophagus, throat, and vocal cords.   Heartburn is a common problem. Despite its name, it has nothing to do with the heart.   When you have heartburn often, you may have a condition called gastroesophageal reflux disease, or GERD.   How does it occur?   At the bottom of the esophagus there is a ring of muscle called a sphincter. It acts like a valve. When you swallow food, the sphincter opens to let the food pass into the stomach. The ring then closes to keep the stomach contents from going back into the esophagus. If the sphincter is weak or too relaxed, stomach acid and food flow backward into the esophagus. Because the esophagus does not have the protective lining that the stomach has, the acid causes pain.   The sphincter muscle sometimes does not work properly if:   You are overweight.   You are pregnant.   You have a hiatal hernia (a condition in which part of the stomach protrudes through the diaphragm into the chest).   You eat too much.   You lie down soon after eating.   You wear tight clothes that push on your stomach.   Foods that may make heartburn worse are:   foods high in fat   sugar   chocolate   peppermint   onions   citrus foods such as orange juice   tomato-based foods   spicy foods   coffee and other drinks with caffeine, such as tea and cheo   alcohol.   Heartburn can also be made worse by:   taking certain medicines, such as aspirin   smoking cigarettes.   Anyone can have an attack of heartburn from overeating or eating foods that are high in acid. Most of the time heartburn is mild and lasts for a short time. There is usually not a problem when heartburn occurs just once in a  while. You should see your healthcare provider if:   You have heartburn nearly every day for 2 weeks.   The heartburn comes back when the antacid wears off.   Heartburn wakes you up at night.   What are the symptoms?   The main symptom of heartburn is a burning pain in the lower chest, usually close to the bottom of the breastbone. Other symptoms you may have are:   acid or sour taste in your mouth   belching   a feeling of bloating or fullness in the stomach.   These symptoms tend to happen after very large meals and especially with activity such as bending or lifting after meals. The symptoms may be made worse by lying down or by wearing tight clothing.   Heartburn is very common during the last few months of pregnancy. The weight of the baby pushes on the stomach and can cause the sphincter to relax and let acid to flow back into the esophagus.   How is it diagnosed?   Usually heartburn can be diagnosed from your medical history.   If there is any question about the diagnosis, you may have the following tests to check for ulcers or other problems that might cause your symptoms:   barium swallow X-ray study of the esophagus   complete upper GI (gastrointestinal) barium X-ray study of the esophagus, stomach, and upper intestine   endoscopy, a procedure in which a thin flexible tube with a tiny camera is placed in your mouth and down into your stomach so your provider can see your esophagus and stomach.   How is it treated?   To help reduce the symptoms of heartburn you can:   Try not to put a lot of pressure on the sphincter muscle. Eating light meals and wearing loose clothing will help.   Lose weight if you are overweight.   Take nonprescription antacids (tablets or liquid) after meals and at bedtime.   Raise the head of your bed or use more than one pillow so your head is higher than your stomach. This may allow gravity to help keep food from backing up.   If you find that certain foods or drinks seem to cause  your symptoms or make them worse, avoid those foods.   If the simple measures described above do not relieve the symptoms, your healthcare provider may prescribe medicine. The prescription medicines help reduce stomach acid. They also help stomach emptying. A very few people who are not helped with medicines may need surgery.   Get emergency care if the following symptoms occur with the heartburn and do not go away within 15 minutes of treatment for heartburn: shortness of breath; sweating; light-headedness, weakness; or jaw, arm, back, or chest pain.   How long will the effects last?   Heartburn symptoms are usually relieved by treatment in just a few hours or less. If you are having heartburn every day, starting treatment will usually relieve the symptoms in a few days. However, the symptoms may come back from time to time, especially if you gain weight.   Heartburn can sometimes make asthma worse. If you have asthma, preventing or controlling heartburn may help control your asthma symptoms.   How can I help prevent heartburn?   The best prevention is to:   Keep a healthy weight. Lose weight if you are overweight.   Sleep with your head elevated at least 4 to 6 inches. (It's usually most comfortable to put the head of your bed on blocks.)   It may also help if you:   Wait an hour or longer after eating before you lie down. If you have to lie down after a meal, lie on your left side. Keep your head and shoulders slightly higher than the rest of your body. It's best to not eat for 2 to 3 hours before you go to bed.   Eat smaller, more frequent meals.   Avoid wearing tight clothing or belts.   Don't smoke. Smoking relaxes the sphincter leading to your stomach.   Avoid foods and other things that seem to cause heartburn or make it worse.   Developed by [employer].   Published by [employer].   Last modified: 2009-01-14   Last reviewed: 2008-12-02     Preventive Health Recommendations  Female Ages 50 - 64    Yearly  exam: See your health care provider every year in order to  o Review health changes.   o Discuss preventive care.    o Review your medicines if your doctor has prescribed any.      Get a Pap test every three years (unless you have an abnormal result and your provider advises testing more often).    If you get Pap tests with HPV test, you only need to test every 5 years, unless you have an abnormal result.     You do not need a Pap test if your uterus was removed (hysterectomy) and you have not had cancer.    You should be tested each year for STDs (sexually transmitted diseases) if you're at risk.     Have a mammogram every 1 to 2 years.    Have a colonoscopy at age 50, or have a yearly FIT test (stool test). These exams screen for colon cancer.      Have a cholesterol test every 5 years, or more often if advised.    Have a diabetes test (fasting glucose) every three years. If you are at risk for diabetes, you should have this test more often.     If you are at risk for osteoporosis (brittle bone disease), think about having a bone density scan (DEXA).    Shots: Get a flu shot each year. Get a tetanus shot every 10 years.    Nutrition:     Eat at least 5 servings of fruits and vegetables each day.    Eat whole-grain bread, whole-wheat pasta and brown rice instead of white grains and rice.    Talk to your provider about Calcium and Vitamin D.     Lifestyle    Exercise at least 150 minutes a week (30 minutes a day, 5 days a week). This will help you control your weight and prevent disease.    Limit alcohol to one drink per day.    No smoking.     Wear sunscreen to prevent skin cancer.     See your dentist every six months for an exam and cleaning.    See your eye doctor every 1 to 2 years.

## 2017-05-02 ENCOUNTER — DOCUMENTATION ONLY (OUTPATIENT)
Dept: LAB | Facility: CLINIC | Age: 57
End: 2017-05-02

## 2017-05-02 ENCOUNTER — OFFICE VISIT (OUTPATIENT)
Dept: FAMILY MEDICINE | Facility: CLINIC | Age: 57
End: 2017-05-02
Payer: COMMERCIAL

## 2017-05-02 VITALS
DIASTOLIC BLOOD PRESSURE: 75 MMHG | WEIGHT: 265 LBS | SYSTOLIC BLOOD PRESSURE: 112 MMHG | BODY MASS INDEX: 44.15 KG/M2 | HEIGHT: 65 IN | HEART RATE: 75 BPM | TEMPERATURE: 97.8 F

## 2017-05-02 DIAGNOSIS — Z00.00 ROUTINE GENERAL MEDICAL EXAMINATION AT A HEALTH CARE FACILITY: Primary | ICD-10-CM

## 2017-05-02 DIAGNOSIS — K21.9 GASTROESOPHAGEAL REFLUX DISEASE WITHOUT ESOPHAGITIS: ICD-10-CM

## 2017-05-02 DIAGNOSIS — Z11.9 SCREENING EXAMINATION FOR INFECTIOUS DISEASE: ICD-10-CM

## 2017-05-02 DIAGNOSIS — F33.8 SEASONAL AFFECTIVE DISORDER (H): ICD-10-CM

## 2017-05-02 DIAGNOSIS — F32.5 MAJOR DEPRESSIVE DISORDER WITH SINGLE EPISODE, IN FULL REMISSION (H): ICD-10-CM

## 2017-05-02 DIAGNOSIS — B37.2 INTERTRIGINOUS CANDIDIASIS: ICD-10-CM

## 2017-05-02 PROCEDURE — 87624 HPV HI-RISK TYP POOLED RSLT: CPT | Performed by: FAMILY MEDICINE

## 2017-05-02 PROCEDURE — 99396 PREV VISIT EST AGE 40-64: CPT | Performed by: FAMILY MEDICINE

## 2017-05-02 PROCEDURE — G0145 SCR C/V CYTO,THINLAYER,RESCR: HCPCS | Performed by: FAMILY MEDICINE

## 2017-05-02 RX ORDER — NYSTATIN AND TRIAMCINOLONE ACETONIDE 100000; 1 [USP'U]/G; MG/G
CREAM TOPICAL 2 TIMES DAILY PRN
Qty: 60 G | Refills: 1 | Status: SHIPPED | OUTPATIENT
Start: 2017-05-02 | End: 2018-08-21

## 2017-05-02 RX ORDER — ESCITALOPRAM OXALATE 20 MG/1
20 TABLET ORAL DAILY
Qty: 90 TABLET | Refills: 1 | Status: SHIPPED | OUTPATIENT
Start: 2017-05-02 | End: 2017-10-26

## 2017-05-02 NOTE — NURSING NOTE
"Chief Complaint   Patient presents with     Physical       Initial /75  Pulse 75  Temp 97.8  F (36.6  C) (Oral)  Ht 5' 5.35\" (1.66 m)  Wt 265 lb (120.2 kg)  LMP 10/12/2012  BMI 43.62 kg/m2 Estimated body mass index is 43.62 kg/(m^2) as calculated from the following:    Height as of this encounter: 5' 5.35\" (1.66 m).    Weight as of this encounter: 265 lb (120.2 kg).  Medication Reconciliation: complete  Starla Ch , ANA LAURA     "

## 2017-05-02 NOTE — PROGRESS NOTES
Your patient did not come to the lab for the hepC test. I pended the order please sign if you want this test held as a future test.  Thanks,  Karin

## 2017-05-02 NOTE — MR AVS SNAPSHOT
After Visit Summary   5/2/2017    Zamzam Willis    MRN: [id number]           Patient Information     Date Of Birth          1960        Visit Information        Provider Department      5/2/2017 7:35 AM Naida Garrett MD [facility]        Today's Diagnoses     Routine general medical examination at a health care facility    -  1    Seasonal affective disorder (H)        Major depressive disorder with single episode, in full remission (H)        Intertriginous candidiasis        Gastroesophageal reflux disease without esophagitis        Screening examination for infectious disease          Care Instructions                Heartburn/GERD   What is heartburn?   Heartburn refers to the symptoms you feel when acids in your stomach flow back into the esophagus. (The esophagus is the tube that carries food from your throat to your stomach.) This backward movement of stomach acid is called reflux. The acid can burn and irritate the esophagus, throat, and vocal cords.   Heartburn is a common problem. Despite its name, it has nothing to do with the heart.   When you have heartburn often, you may have a condition called gastroesophageal reflux disease, or GERD.   How does it occur?   At the bottom of the esophagus there is a ring of muscle called a sphincter. It acts like a valve. When you swallow food, the sphincter opens to let the food pass into the stomach. The ring then closes to keep the stomach contents from going back into the esophagus. If the sphincter is weak or too relaxed, stomach acid and food flow backward into the esophagus. Because the esophagus does not have the protective lining that the stomach has, the acid causes pain.   The sphincter muscle sometimes does not work properly if:   You are overweight.   You are pregnant.   You have a hiatal hernia (a condition in which part of the stomach protrudes through the diaphragm into the chest).   You eat too much.   You lie  down soon after eating.   You wear tight clothes that push on your stomach.   Foods that may make heartburn worse are:   foods high in fat   sugar   chocolate   peppermint   onions   citrus foods such as orange juice   tomato-based foods   spicy foods   coffee and other drinks with caffeine, such as tea and cheo   alcohol.   Heartburn can also be made worse by:   taking certain medicines, such as aspirin   smoking cigarettes.   Anyone can have an attack of heartburn from overeating or eating foods that are high in acid. Most of the time heartburn is mild and lasts for a short time. There is usually not a problem when heartburn occurs just once in a while. You should see your healthcare provider if:   You have heartburn nearly every day for 2 weeks.   The heartburn comes back when the antacid wears off.   Heartburn wakes you up at night.   What are the symptoms?   The main symptom of heartburn is a burning pain in the lower chest, usually close to the bottom of the breastbone. Other symptoms you may have are:   acid or sour taste in your mouth   belching   a feeling of bloating or fullness in the stomach.   These symptoms tend to happen after very large meals and especially with activity such as bending or lifting after meals. The symptoms may be made worse by lying down or by wearing tight clothing.   Heartburn is very common during the last few months of pregnancy. The weight of the baby pushes on the stomach and can cause the sphincter to relax and let acid to flow back into the esophagus.   How is it diagnosed?   Usually heartburn can be diagnosed from your medical history.   If there is any question about the diagnosis, you may have the following tests to check for ulcers or other problems that might cause your symptoms:   barium swallow X-ray study of the esophagus   complete upper GI (gastrointestinal) barium X-ray study of the esophagus, stomach, and upper intestine   endoscopy, a procedure in which a thin  flexible tube with a tiny camera is placed in your mouth and down into your stomach so your provider can see your esophagus and stomach.   How is it treated?   To help reduce the symptoms of heartburn you can:   Try not to put a lot of pressure on the sphincter muscle. Eating light meals and wearing loose clothing will help.   Lose weight if you are overweight.   Take nonprescription antacids (tablets or liquid) after meals and at bedtime.   Raise the head of your bed or use more than one pillow so your head is higher than your stomach. This may allow gravity to help keep food from backing up.   If you find that certain foods or drinks seem to cause your symptoms or make them worse, avoid those foods.   If the simple measures described above do not relieve the symptoms, your healthcare provider may prescribe medicine. The prescription medicines help reduce stomach acid. They also help stomach emptying. A very few people who are not helped with medicines may need surgery.   Get emergency care if the following symptoms occur with the heartburn and do not go away within 15 minutes of treatment for heartburn: shortness of breath; sweating; light-headedness, weakness; or jaw, arm, back, or chest pain.   How long will the effects last?   Heartburn symptoms are usually relieved by treatment in just a few hours or less. If you are having heartburn every day, starting treatment will usually relieve the symptoms in a few days. However, the symptoms may come back from time to time, especially if you gain weight.   Heartburn can sometimes make asthma worse. If you have asthma, preventing or controlling heartburn may help control your asthma symptoms.   How can I help prevent heartburn?   The best prevention is to:   Keep a healthy weight. Lose weight if you are overweight.   Sleep with your head elevated at least 4 to 6 inches. (It's usually most comfortable to put the head of your bed on blocks.)   It may also help if you:    Wait an hour or longer after eating before you lie down. If you have to lie down after a meal, lie on your left side. Keep your head and shoulders slightly higher than the rest of your body. It's best to not eat for 2 to 3 hours before you go to bed.   Eat smaller, more frequent meals.   Avoid wearing tight clothing or belts.   Don't smoke. Smoking relaxes the sphincter leading to your stomach.   Avoid foods and other things that seem to cause heartburn or make it worse.   Developed by [employer].   Published by [employer].   Last modified: 2009-01-14   Last reviewed: 2008-12-02     Preventive Health Recommendations  Female Ages 50 - 64    Yearly exam: See your health care provider every year in order to  o Review health changes.   o Discuss preventive care.    o Review your medicines if your doctor has prescribed any.      Get a Pap test every three years (unless you have an abnormal result and your provider advises testing more often).    If you get Pap tests with HPV test, you only need to test every 5 years, unless you have an abnormal result.     You do not need a Pap test if your uterus was removed (hysterectomy) and you have not had cancer.    You should be tested each year for STDs (sexually transmitted diseases) if you're at risk.     Have a mammogram every 1 to 2 years.    Have a colonoscopy at age 50, or have a yearly FIT test (stool test). These exams screen for colon cancer.      Have a cholesterol test every 5 years, or more often if advised.    Have a diabetes test (fasting glucose) every three years. If you are at risk for diabetes, you should have this test more often.     If you are at risk for osteoporosis (brittle bone disease), think about having a bone density scan (DEXA).    Shots: Get a flu shot each year. Get a tetanus shot every 10 years.    Nutrition:     Eat at least 5 servings of fruits and vegetables each day.    Eat whole-grain bread, whole-wheat pasta and brown rice instead of  "white grains and rice.    Talk to your provider about Calcium and Vitamin D.     Lifestyle    Exercise at least 150 minutes a week (30 minutes a day, 5 days a week). This will help you control your weight and prevent disease.    Limit alcohol to one drink per day.    No smoking.     Wear sunscreen to prevent skin cancer.     See your dentist every six months for an exam and cleaning.    See your eye doctor every 1 to 2 years.          Follow-ups after your visit        Who to contact     If you have questions or need follow up information about today's clinic visit or your schedule please contact [facility] directly at [phone].  Normal or non-critical lab and imaging results will be communicated to you by [employer], letter or phone within 4 business days after the clinic has received the results. If you do not hear from us within 7 days, please contact the clinic through [email or web address] or phone. If you have a critical or abnormal lab result, we will notify you by phone as soon as possible.  Submit refill requests through [employer] or call your pharmacy and they will forward the refill request to us. Please allow 3 business days for your refill to be completed.          Additional Information About Your Visit        [employer] Information     [employer] gives you secure access to your electronic health record. If you see a primary care provider, you can also send messages to your care team and make appointments. If you have questions, please call your primary care clinic.  If you do not have a primary care provider, please call [phone] and they will assist you.        Care EveryWhere ID     This is your Care EveryWhere ID. This could be used by other organizations to access your [facility] medical records  [id number]        Your Vitals Were     Pulse Temperature Height Last Period BMI (Body Mass Index)       75 97.8  F (36.6  C) (Oral) 5' 5.35\" (1.66 m) 10/12/2012 43.62 kg/m2        Blood Pressure from Last 3 " Encounters:   05/02/17 112/75   03/05/17 115/76   01/05/17 113/78    Weight from Last 3 Encounters:   05/02/17 265 lb (120.2 kg)   03/05/17 263 lb (119.3 kg)   01/05/17 264 lb (119.7 kg)              We Performed the Following     Hepatitis C antibody     HPV High Risk Types DNA Cervical     Pap imaged thin layer screen with HPV - recommended age 30 - 65 years (select HPV order below)          Today's Medication Changes          These changes are accurate as of: 5/2/17  8:21 AM.  If you have any questions, ask your nurse or doctor.               Start taking these medicines.        Dose/Directions    nystatin-triamcinolone cream   Commonly known as:  MYCOLOG II   Used for:  Intertriginous candidiasis   Started by:  Naida Garrett MD        Apply topically 2 times daily as needed   Quantity:  60 g   Refills:  1            Where to get your medicines      These medications were sent to [facility] [address] [place] [address] [facility] AT [address]  [address] [facility] [address]     Phone:  [phone]     escitalopram 20 MG tablet    nystatin-triamcinolone cream         Some of these will need a paper prescription and others can be bought over the counter.  Ask your nurse if you have questions.     Bring a paper prescription for each of these medications     omeprazole 20 MG CR capsule                Primary Care Provider Office Phone # Fax #    Naida Garrett [phone]       [facility] [place] [address] [facility] [address]        Thank you!     Thank you for choosing [facility]  for your care. Our goal is always to provide you with excellent care. Hearing back from our patients is one way we can continue to improve our services. Please take a few minutes to complete the written survey that you may receive in the mail after your visit with us. Thank you!             Your Updated Medication List - Protect others around  you: Learn how to safely use, store and throw away your medicines at www.disposemymeds.org.          This list is accurate as of: 5/2/17  8:21 AM.  Always use your most recent med list.                   Brand Name Dispense Instructions for use    ACETAMIN PO      Take  by mouth as needed.       aspirin-acetaminophen-caffeine 250-250-65 MG per tablet    EXCEDRIN MIGRAINE    80 tablet    Take 1 tablet by mouth every 6 hours as needed for headaches       calcium 600 + D 600-400 MG-UNIT per tablet   Generic drug:  calcium-vitamin D     180 Tab    take one tablet twice a day       cholecalciferol 1000 UNIT tablet    vitamin D     Take 1 tablet by mouth daily.       escitalopram 20 MG tablet    LEXAPRO    90 tablet    Take 1 tablet (20 mg) by mouth daily       fluticasone 50 MCG/ACT spray    FLONASE    1 g    Spray 1-2 sprays into both nostrils daily       IBU PO      Take  by mouth as needed.       Magnesium 500 MG Caps      1 TABLET DAILY       MULTIVITAMIN PO      1 TABLET DAILY       nystatin-triamcinolone cream    MYCOLOG II    60 g    Apply topically 2 times daily as needed       omeprazole 20 MG CR capsule    priLOSEC    90 capsule    Take 1 capsule (20 mg) by mouth daily       order for DME      Res Med S9 auto CPAP9-13 cm H2O, [place] Fx best medium

## 2017-05-03 LAB
COPATH REPORT: NORMAL
PAP: NORMAL

## 2017-05-03 ASSESSMENT — PATIENT HEALTH QUESTIONNAIRE - PHQ9: SUM OF ALL RESPONSES TO PHQ QUESTIONS 1-9: 6

## 2017-05-05 LAB
FINAL DIAGNOSIS: NORMAL
HPV HR 12 DNA CVX QL NAA+PROBE: NEGATIVE
HPV16 DNA SPEC QL NAA+PROBE: NEGATIVE
HPV18 DNA SPEC QL NAA+PROBE: NEGATIVE
SPECIMEN DESCRIPTION: NORMAL

## 2017-05-08 DIAGNOSIS — B37.2 INTERTRIGINOUS CANDIDIASIS: ICD-10-CM

## 2017-05-09 RX ORDER — NYSTATIN AND TRIAMCINOLONE ACETONIDE 100000; 1 [USP'U]/G; MG/G
CREAM TOPICAL 2 TIMES DAILY PRN
Qty: 0.1 G | Refills: 0 | OUTPATIENT
Start: 2017-05-09

## 2017-05-24 ENCOUNTER — TELEPHONE (OUTPATIENT)
Dept: FAMILY MEDICINE | Facility: CLINIC | Age: 57
End: 2017-05-24

## 2017-05-24 NOTE — TELEPHONE ENCOUNTER
Patient was in hospital for emergency department due to hip pain.  Patient had work up and could not determine cause.  Saw Chiropractor and he diagnosed her 2 bulging discs via xray.  Per emergency department discharge instructions to follow up Orthopedics in 3 days.   Patient went to Chiropractor instead.  He want Dr Naida Garrett to prescribe Prednisone for treatment.  Patient is informed Dr Naida Garrett will not prescribe Prednisone without an evaluation  Patient schedule an appointment with Dr Dasha Barbour tomorrow.  The patient/parent agrees with the plan and verbalized good understanding.    Dr Naida SHAW.  Gladys Malone RN

## 2017-05-24 NOTE — TELEPHONE ENCOUNTER
Reason for Call:  Medication or medication refill:    Do you use a [facility]?  Name of the pharmacy and phone number for the current request:  [facility] in [place]  [phone]    Name of the medication requested: Predisone packs     Other request:     Can we leave a detailed message on this number? YES    Phone number patient can be reached at: Home number on file [phone] (home)    Best Time: anytime    Call taken on 5/24/2017 at 2:38 PM by Amarilis Jalloh

## 2017-05-25 ENCOUNTER — MYC MEDICAL ADVICE (OUTPATIENT)
Dept: SLEEP MEDICINE | Facility: CLINIC | Age: 57
End: 2017-05-25

## 2017-05-25 ENCOUNTER — TELEPHONE (OUTPATIENT)
Dept: SLEEP MEDICINE | Facility: CLINIC | Age: 57
End: 2017-05-25

## 2017-05-25 ENCOUNTER — OFFICE VISIT (OUTPATIENT)
Dept: FAMILY MEDICINE | Facility: CLINIC | Age: 57
End: 2017-05-25
Payer: COMMERCIAL

## 2017-05-25 VITALS
DIASTOLIC BLOOD PRESSURE: 86 MMHG | HEART RATE: 110 BPM | BODY MASS INDEX: 42.96 KG/M2 | SYSTOLIC BLOOD PRESSURE: 128 MMHG | TEMPERATURE: 98.4 F | WEIGHT: 261 LBS | OXYGEN SATURATION: 98 % | RESPIRATION RATE: 15 BRPM

## 2017-05-25 DIAGNOSIS — G47.33 OSA (OBSTRUCTIVE SLEEP APNEA): Primary | ICD-10-CM

## 2017-05-25 DIAGNOSIS — G57.02 PIRIFORMIS SYNDROME OF LEFT SIDE: ICD-10-CM

## 2017-05-25 DIAGNOSIS — M51.369 DEGENERATION OF LUMBAR INTERVERTEBRAL DISC: Primary | ICD-10-CM

## 2017-05-25 DIAGNOSIS — M54.50 ACUTE LEFT-SIDED LOW BACK PAIN WITHOUT SCIATICA: ICD-10-CM

## 2017-05-25 PROCEDURE — 99214 OFFICE O/P EST MOD 30 MIN: CPT | Performed by: FAMILY MEDICINE

## 2017-05-25 RX ORDER — METHYLPREDNISOLONE 4 MG
TABLET, DOSE PACK ORAL
Qty: 21 TABLET | Refills: 0 | Status: SHIPPED | OUTPATIENT
Start: 2017-05-25 | End: 2023-01-04

## 2017-05-25 RX ORDER — GABAPENTIN 300 MG/1
CAPSULE ORAL
Qty: 90 CAPSULE | Refills: 0 | Status: SHIPPED | OUTPATIENT
Start: 2017-05-25 | End: 2023-01-04

## 2017-05-25 RX ORDER — HYDROCODONE BITARTRATE AND ACETAMINOPHEN 5; 325 MG/1; MG/1
1 TABLET ORAL EVERY 6 HOURS PRN
Qty: 15 TABLET | Refills: 0 | Status: SHIPPED | OUTPATIENT
Start: 2017-05-25 | End: 2023-01-04

## 2017-05-25 RX ORDER — HYDROCODONE BITARTRATE AND ACETAMINOPHEN 5; 325 MG/1; MG/1
TABLET ORAL
Refills: 0 | COMMUNITY
Start: 2017-05-20 | End: 2023-01-04

## 2017-05-25 ASSESSMENT — PAIN SCALES - GENERAL: PAINLEVEL: MODERATE PAIN (4)

## 2017-05-25 NOTE — PROGRESS NOTES
SUBJECTIVE:                                                    Zamzam Willis is a 56 year old female who presents to clinic today for the following health issues:      Back Pain      Duration: 5/19/17        Specific cause: none    Description:   Location of pain: low back left  Character of pain: sharp and burning  Pain radiation:radiates into the left groin  New numbness or weakness in legs, not attributed to pain:  YES    Intensity: Currently 4/10    History:   Pain interferes with job: YES  History of back problems: no prior back problems  Any previous MRI or X-rays: None  Sees a specialist for back pain:  No  Therapies tried without relief: hydrocodone, chiropractor, cold and heat    Alleviating factors:   Improved by: hydrocodone, chiropractor, cold and heat      Precipitating factors:  Worsened by: Walking     Accompanying Signs & Symptoms:  Risk of Fracture:  None  Risk of Cauda Equina:  None  Risk of Infection:  None  Risk of Cancer:  Some night pain but MRI already done and is normal.   Risk of Ankylosing Spondylitis:  Onset at age <35, male, AND morning back stiffness. no                  Has a known history of left lower back pain for a while which hasn't bothered her  Last week started having pain woke up in the middle of the night with severe pain couldn't walk  Pain in left lower quadrant groin and left lumbar left gluteal area  Was so severe called the ambulance to [facility].  At [facility] CT abdominal pelvis was negative   Was told likely constipation. Was given polyethelene glycol  But the next day woke up again with same pains. To the point where she could hardly move. Patient went back to ER.  Was seen and had MRI of the spine and hip unremarkably except for some disc protrusions mild multiple levels  Was given pain meds at that time. Was given a walker.    Patient went to see a chiropractor. Was told that she needed to be seen for steroid pack and additional narcotics    No thoughts of harming  self or others. Depression stable  Problem list, Medication list, Allergies, and Medical/Social/Surgical histories reviewed in [facility] and updated as appropriate.        REVIEW OF SYSTEMS  General: negative for fever, constitutional symptoms or weight loss  Resp: negative for chest pain or shortness of breath  CV: negative for chest pain  : negative for dysuria , incontinence, frequency  Musculoskeletal: as above  Neurologic: negative for ataxia, saddle anesthesia, fecal or urinary incontinence, one sided weakness,  Paresthesias  Constitutional, HEENT, cardiovascular, pulmonary, gi and gu systems are negative, except as otherwise noted.    Physical Exam:  Vitals: /86  Pulse 110  Temp 98.4  F (36.9  C) (Tympanic)  Resp 15  Wt 261 lb (118.4 kg)  LMP 10/12/2012  SpO2 98%  Breastfeeding? No  BMI 42.96 kg/m2  BMI= Body mass index is 42.96 kg/(m^2).  Constitutional: healthy, alert and no acute distress   Head: atraumatic  CARDIO: regular in rate and rhythm no murmurs rubs or gallops  RESP: lungs clear to auscultation  ABDOMEN: soft nontender  NEURO: Patellar reflexes intact and equal b/l  BACK:  Straight leg raise intact, No spine tenderness  Mild left lumbar paraspinal muscle tenderness to palpation, strength intact and equal b/l lower extremities. Sensory intact. Rectal exam declined/deferred.   Increased pain with range of motion of the back  Good hip range of motion slight tenderness left SI joint.   GAIT: intact  Psychiatric: mentation appears normal and affect normal/bright  Skin: no rash      Impression:    ICD-10-CM    1. Degeneration of lumbar intervertebral disc M51.36 methylPREDNISolone (MEDROL DOSEPAK) 4 MG tablet   2. Acute left-sided low back pain without sciatica M54.5 methylPREDNISolone (MEDROL DOSEPAK) 4 MG tablet     gabapentin (NEURONTIN) 300 MG capsule     HYDROcodone-acetaminophen (NORCO) 5-325 MG per tablet     ORTHO  REFERRAL   3. Piriformis syndrome of left side G57.02  HYDROcodone-acetaminophen (NORCO) 5-325 MG per tablet     ORTHO  REFERRAL         Plan:  Prescribed with medrol dose pack. Side effects discussed  Patient was looked up in [facility] and there are No concerns for controlled substance abuse.   Limited supply for vicodin prescribed. Warned sedating and habit forming  No thoughts of harming self or others   Sedating medications given. Aware not to drive or operate machinery while on these medications. Caution with .   Prescribed with gabapentin as needed pain moderate pain.   Warned also sedating. Try not to take too many sedating meds together  Referred to ortho for follow up if symptoms persist.   Instructions for back care and return precautions discussed.    back pain stretching excercises discussed. supportive treatment.  considery physical therapy if not better despite supportive treatment.  activity modifications advised.  Over the counter medications discussed. Patient aware to avoid NSAIDS if with any kidney disease or ulcers. Proper dosing of over the counter medications likewise discussed.  Adverse reaction to medication discussed.  aware to come in right away if with any fever or chills, worsening symptoms, headache, bowel or bladder incontinence, motor or sensory deficits or gait disturbances.   follow-up recommended.      Sherly Barbour MD

## 2017-05-25 NOTE — NURSING NOTE
"Chief Complaint   Patient presents with     Back Pain     pt c/o back pain and hip pain, seen in [facility] ER twice 5/19/17 and 5/20/17  see care everywhere       Initial /86  Pulse 110  Temp 98.4  F (36.9  C) (Tympanic)  Resp 15  Wt 261 lb (118.4 kg)  LMP 10/12/2012  SpO2 98%  Breastfeeding? No  BMI 42.96 kg/m2 Estimated body mass index is 42.96 kg/(m^2) as calculated from the following:    Height as of 5/2/17: 5' 5.35\" (1.66 m).    Weight as of this encounter: 261 lb (118.4 kg).  Medication Reconciliation: complete   Yessica Avila MA          "

## 2017-05-25 NOTE — MR AVS SNAPSHOT
After Visit Summary   5/25/2017    Zamzam Willis    MRN: [id number]           Patient Information     Date Of Birth          1960        Visit Information        Provider Department      5/25/2017 3:00 PM Sherly Barbour MD [facility]        Today's Diagnoses     Degeneration of lumbar intervertebral disc    -  1    Acute left-sided low back pain without sciatica        Piriformis syndrome of left side           Follow-ups after your visit        Additional Services     ORTHO  REFERRAL       [facility] is referring you to the Orthopedic  Services at [facility].       The  Representative will assist you in the coordination of your Orthopedic and Musculoskeletal Care as prescribed by your physician.    The  Representative will call you within 1 business day to help schedule your appointment, or you may contact the  Representative at:    All areas ~ [phone]     Type of Referral : Non Surgical       Timeframe requested: 3 - 5 days    Coverage of these services is subject to the terms and limitations of your health insurance plan.  Please call member services at your health plan with any benefit or coverage questions.      If X-rays, CT or MRI's have been performed, please contact the facility where they were done to arrange for , prior to your scheduled appointment.  Please bring this referral request to your appointment and present it to your specialist.                  Who to contact     If you have questions or need follow up information about today's clinic visit or your schedule please contact [facility] directly at [phone].  Normal or non-critical lab and imaging results will be communicated to you by MyChart, letter or phone within 4 business days after the clinic has received the results. If you do not hear from us within 7 days, please contact the  clinic through [employer] or phone. If you have a critical or abnormal lab result, we will notify you by phone as soon as possible.  Submit refill requests through [employer] or call your pharmacy and they will forward the refill request to us. Please allow 3 business days for your refill to be completed.          Additional Information About Your Visit        Make My [employer] Information     [employer] gives you secure access to your electronic health record. If you see a primary care provider, you can also send messages to your care team and make appointments. If you have questions, please call your primary care clinic.  If you do not have a primary care provider, please call [phone] and they will assist you.        Care EveryWhere ID     This is your Care EveryWhere ID. This could be used by other organizations to access your [place] medical records  [id number]        Your Vitals Were     Pulse Temperature Respirations Last Period Pulse Oximetry Breastfeeding?    110 98.4  F (36.9  C) (Tympanic) 15 10/12/2012 98% No    BMI (Body Mass Index)                   42.96 kg/m2            Blood Pressure from Last 3 Encounters:   05/25/17 128/86   05/02/17 112/75   03/05/17 115/76    Weight from Last 3 Encounters:   05/25/17 261 lb (118.4 kg)   05/02/17 265 lb (120.2 kg)   03/05/17 263 lb (119.3 kg)              We Performed the Following     ORTHO  REFERRAL          Today's Medication Changes          These changes are accurate as of: 5/25/17  3:16 PM.  If you have any questions, ask your nurse or doctor.               Start taking these medicines.        Dose/Directions    gabapentin 300 MG capsule   Commonly known as:  NEURONTIN   Used for:  Acute left-sided low back pain without sciatica   Started by:  Sherly Barbour MD        Take 1 tablet (300 mg) every night for 1-3 days, then 1 tablet twice daily for 1-3 days, then 1 tablet three times daily   Quantity:  90 capsule   Refills:  0       methylPREDNISolone 4  MG tablet   Commonly known as:  MEDROL DOSEPAK   Used for:  Degeneration of lumbar intervertebral disc, Acute left-sided low back pain without sciatica   Started by:  Sherly Barbour MD        Follow package instructions   Quantity:  21 tablet   Refills:  0         These medicines have changed or have updated prescriptions.        Dose/Directions    * HYDROcodone-acetaminophen 5-325 MG per tablet   Commonly known as:  NORCO   This may have changed:  Another medication with the same name was added. Make sure you understand how and when to take each.   Changed by:  Sherly Barbour MD        TK 1 T PO Q 6 H PRN P   Refills:  0       * HYDROcodone-acetaminophen 5-325 MG per tablet   Commonly known as:  NORCO   This may have changed:  You were already taking a medication with the same name, and this prescription was added. Make sure you understand how and when to take each.   Used for:  Acute left-sided low back pain without sciatica, Piriformis syndrome of left side   Changed by:  Sherly Barbour MD        Dose:  1 tablet   Take 1 tablet by mouth every 6 hours as needed for moderate to severe pain or pain   Quantity:  15 tablet   Refills:  0       * Notice:  This list has 2 medication(s) that are the same as other medications prescribed for you. Read the directions carefully, and ask your doctor or other care provider to review them with you.         Where to get your medicines      These medications were sent to [facility] [place], [place] [address] [place] [address], [address]  [address] [address] [address]     Phone:  [phone]     gabapentin 300 MG capsule    methylPREDNISolone 4 MG tablet         Some of these will need a paper prescription and others can be bought over the counter.  Ask your nurse if you have questions.     Bring a paper prescription for each of these medications     HYDROcodone-acetaminophen 5-325 MG per tablet                Primary Care  Provider Office Phone # Fax #    Naida Sulema Garrett [phone]       [facility] [address] [address] [address]        Thank you!     Thank you for choosing [facility]  for your care. Our goal is always to provide you with excellent care. Hearing back from our patients is one way we can continue to improve our services. Please take a few minutes to complete the written survey that you may receive in the mail after your visit with us. Thank you!             Your Updated Medication List - Protect others around you: Learn how to safely use, store and throw away your medicines at www.disposemymeds.org.          This list is accurate as of: 5/25/17  3:16 PM.  Always use your most recent med list.                   Brand Name Dispense Instructions for use    ACETAMIN PO      Take  by mouth as needed.       aspirin-acetaminophen-caffeine 250-250-65 MG per tablet    EXCEDRIN MIGRAINE    80 tablet    Take 1 tablet by mouth every 6 hours as needed for headaches       calcium 600 + D 600-400 MG-UNIT per tablet   Generic drug:  calcium-vitamin D     180 Tab    take one tablet twice a day       cholecalciferol 1000 UNIT tablet    vitamin D     Take 1 tablet by mouth daily.       escitalopram 20 MG tablet    LEXAPRO    90 tablet    Take 1 tablet (20 mg) by mouth daily       fluticasone 50 MCG/ACT spray    FLONASE    1 g    Spray 1-2 sprays into both nostrils daily       gabapentin 300 MG capsule    NEURONTIN    90 capsule    Take 1 tablet (300 mg) every night for 1-3 days, then 1 tablet twice daily for 1-3 days, then 1 tablet three times daily       * HYDROcodone-acetaminophen 5-325 MG per tablet    NORCO     TK 1 T PO Q 6 H PRN P       * HYDROcodone-acetaminophen 5-325 MG per tablet    NORCO    15 tablet    Take 1 tablet by mouth every 6 hours as needed for moderate to severe pain or pain       IBU PO      Take  by mouth as needed.       Magnesium 500 MG Caps      1 TABLET  DAILY       methylPREDNISolone 4 MG tablet    MEDROL DOSEPAK    21 tablet    Follow package instructions       MULTIVITAMIN PO      1 TABLET DAILY       nystatin-triamcinolone cream    MYCOLOG II    60 g    Apply topically 2 times daily as needed       omeprazole 20 MG CR capsule    priLOSEC    90 capsule    Take 1 capsule (20 mg) by mouth daily       order for DME      Res Med S9 auto CPAP9-13 cm H2O, Grant Fx best medium       * Notice:  This list has 2 medication(s) that are the same as other medications prescribed for you. Read the directions carefully, and ask your doctor or other care provider to review them with you.

## 2017-05-25 NOTE — TELEPHONE ENCOUNTER
From: Zamzam Willis  To: Oskar Crowe MD  Sent: 5/25/2017 8:16 AM CDT  Subject: cpap supplies    I need new cpap supplies, how can I order them?    Zamzam Willis

## 2017-05-25 NOTE — TELEPHONE ENCOUNTER
Refill request received via [employer] by patient    COMPREHENSIVE DME        Last Written Prescription Date:  09/25/2014  Last Fill Quantity: PRN,   # refills: ONE YEAR  Last Office Visit with [facility], [facility] or [facility] prescribing provider: 09/04/2015  Future Office visit: ONE YEAR RECOMMENDED   Next 5 appointments (look out 90 days)     May 25, 2017  3:00 PM CDT   SHORT with Sherly Barbour MD   [facility] ([facility]    [address] [facility] [address] [phone]                   Routing refill request to provider for review/approval because:  Drug not on the [facility], [facility] or [facility] refill protocol or controlled substance

## 2017-06-05 DIAGNOSIS — F32.5 MAJOR DEPRESSIVE DISORDER WITH SINGLE EPISODE, IN FULL REMISSION (H): ICD-10-CM

## 2017-06-05 DIAGNOSIS — F33.8 SEASONAL AFFECTIVE DISORDER (H): ICD-10-CM

## 2017-06-05 RX ORDER — ESCITALOPRAM OXALATE 20 MG/1
20 TABLET ORAL DAILY
Qty: 90 TABLET | Refills: 1 | OUTPATIENT
Start: 2017-06-05

## 2017-10-26 DIAGNOSIS — F33.8 SEASONAL AFFECTIVE DISORDER (H): ICD-10-CM

## 2017-10-26 DIAGNOSIS — F32.5 MAJOR DEPRESSIVE DISORDER WITH SINGLE EPISODE, IN FULL REMISSION (H): ICD-10-CM

## 2017-10-26 RX ORDER — ESCITALOPRAM OXALATE 20 MG/1
TABLET ORAL
Qty: 90 TABLET | Refills: 0 | Status: SHIPPED | OUTPATIENT
Start: 2017-10-26 | End: 2018-01-20

## 2018-01-20 DIAGNOSIS — F33.8 SEASONAL AFFECTIVE DISORDER (H): ICD-10-CM

## 2018-01-20 DIAGNOSIS — F32.5 MAJOR DEPRESSIVE DISORDER WITH SINGLE EPISODE, IN FULL REMISSION (H): ICD-10-CM

## 2018-01-23 NOTE — TELEPHONE ENCOUNTER
Patient instructed to do an E-visit.  PHQ-9 sent through [employer].  .Charley POLANCON, RN, CPN

## 2018-01-24 RX ORDER — ESCITALOPRAM OXALATE 20 MG/1
TABLET ORAL
Qty: 30 TABLET | Refills: 0 | Status: SHIPPED | OUTPATIENT
Start: 2018-01-24 | End: 2018-03-06

## 2018-02-22 ENCOUNTER — TELEPHONE (OUTPATIENT)
Dept: FAMILY MEDICINE | Facility: CLINIC | Age: 58
End: 2018-02-22

## 2018-02-22 NOTE — TELEPHONE ENCOUNTER
Panel Management Review      Patient has the following on her problem list: None      Composite cancer screening  Chart review shows that this patient is due/due soon for the following Colonoscopy and Fecal Colorectal (FIT)  Summary:    Patient is due/failing the following:   FIT    Action needed:   Patient needs non-fasting lab only appointment    Type of outreach:    Sent [employer] message.    Questions for provider review:    None                                                                                                                                    Starla Ch [facility]       Chart routed to Care Team .

## 2018-03-05 ENCOUNTER — E-VISIT (OUTPATIENT)
Dept: FAMILY MEDICINE | Facility: CLINIC | Age: 58
End: 2018-03-05
Payer: COMMERCIAL

## 2018-03-05 DIAGNOSIS — F32.5 MAJOR DEPRESSIVE DISORDER WITH SINGLE EPISODE, IN FULL REMISSION (H): ICD-10-CM

## 2018-03-05 DIAGNOSIS — F33.8 SEASONAL AFFECTIVE DISORDER (H): ICD-10-CM

## 2018-03-05 PROCEDURE — 99444 ZZC PHYSICIAN ONLINE EVALUATION & MANAGEMENT SERVICE: CPT | Performed by: FAMILY MEDICINE

## 2018-03-05 ASSESSMENT — PATIENT HEALTH QUESTIONNAIRE - PHQ9
10. IF YOU CHECKED OFF ANY PROBLEMS, HOW DIFFICULT HAVE THESE PROBLEMS MADE IT FOR YOU TO DO YOUR WORK, TAKE CARE OF THINGS AT HOME, OR GET ALONG WITH OTHER PEOPLE: NOT DIFFICULT AT ALL
SUM OF ALL RESPONSES TO PHQ QUESTIONS 1-9: 2
SUM OF ALL RESPONSES TO PHQ QUESTIONS 1-9: 2

## 2018-03-05 ASSESSMENT — ANXIETY QUESTIONNAIRES
2. NOT BEING ABLE TO STOP OR CONTROL WORRYING: NOT AT ALL
GAD7 TOTAL SCORE: 1
7. FEELING AFRAID AS IF SOMETHING AWFUL MIGHT HAPPEN: NOT AT ALL
6. BECOMING EASILY ANNOYED OR IRRITABLE: NOT AT ALL
4. TROUBLE RELAXING: NOT AT ALL
GAD7 TOTAL SCORE: 1
3. WORRYING TOO MUCH ABOUT DIFFERENT THINGS: NOT AT ALL
GAD7 TOTAL SCORE: 1
5. BEING SO RESTLESS THAT IT IS HARD TO SIT STILL: NOT AT ALL
7. FEELING AFRAID AS IF SOMETHING AWFUL MIGHT HAPPEN: NOT AT ALL
1. FEELING NERVOUS, ANXIOUS, OR ON EDGE: SEVERAL DAYS

## 2018-03-06 RX ORDER — ESCITALOPRAM OXALATE 20 MG/1
20 TABLET ORAL DAILY
Qty: 90 TABLET | Refills: 0 | Status: SHIPPED | OUTPATIENT
Start: 2018-03-06 | End: 2018-06-03

## 2018-03-06 ASSESSMENT — ANXIETY QUESTIONNAIRES: GAD7 TOTAL SCORE: 1

## 2018-03-06 ASSESSMENT — PATIENT HEALTH QUESTIONNAIRE - PHQ9: SUM OF ALL RESPONSES TO PHQ QUESTIONS 1-9: 2

## 2018-06-03 DIAGNOSIS — F33.8 SEASONAL AFFECTIVE DISORDER (H): ICD-10-CM

## 2018-06-03 DIAGNOSIS — F32.5 MAJOR DEPRESSIVE DISORDER WITH SINGLE EPISODE, IN FULL REMISSION (H): ICD-10-CM

## 2018-06-06 RX ORDER — ESCITALOPRAM OXALATE 20 MG/1
TABLET ORAL
Qty: 90 TABLET | Refills: 0 | Status: SHIPPED | OUTPATIENT
Start: 2018-06-06 | End: 2018-08-30

## 2018-08-21 ENCOUNTER — MYC REFILL (OUTPATIENT)
Dept: FAMILY MEDICINE | Facility: CLINIC | Age: 58
End: 2018-08-21

## 2018-08-21 DIAGNOSIS — B37.2 INTERTRIGINOUS CANDIDIASIS: ICD-10-CM

## 2018-08-22 NOTE — TELEPHONE ENCOUNTER
Message from MyChart:  Original authorizing provider: MD Zamzam Day would like a refill of the following medications:  nystatin-triamcinolone (MYCOLOG II) cream [Naida Garrett MD]    Preferred pharmacy: [facility] [address] [place] [address], [place] - [address] [facility] [address]    Comment:

## 2018-08-23 RX ORDER — NYSTATIN AND TRIAMCINOLONE ACETONIDE 100000; 1 [USP'U]/G; MG/G
CREAM TOPICAL 2 TIMES DAILY PRN
Qty: 15 G | Refills: 0 | Status: SHIPPED | OUTPATIENT
Start: 2018-08-23 | End: 2023-05-04

## 2018-08-23 NOTE — TELEPHONE ENCOUNTER
Pt overdue for stool card and AFE. Please call to schedule her. Was supposed to f/u in 5/18, given 6 month refill of lexapro 6/18 despite being overdue for several things.  See Evisit 3/18.  Refilled small tube, needs appt for further refills

## 2018-08-23 NOTE — TELEPHONE ENCOUNTER
Routing refill request to provider for review/approval because:  Drug not on the [facility] refill protocol   Karin Magaña BSN, RN

## 2019-11-08 ENCOUNTER — HEALTH MAINTENANCE LETTER (OUTPATIENT)
Age: 59
End: 2019-11-08

## 2020-12-06 ENCOUNTER — HEALTH MAINTENANCE LETTER (OUTPATIENT)
Age: 60
End: 2020-12-06

## 2021-09-25 ENCOUNTER — HEALTH MAINTENANCE LETTER (OUTPATIENT)
Age: 61
End: 2021-09-25

## 2021-11-20 ENCOUNTER — HEALTH MAINTENANCE LETTER (OUTPATIENT)
Age: 61
End: 2021-11-20

## 2022-01-15 ENCOUNTER — HEALTH MAINTENANCE LETTER (OUTPATIENT)
Age: 62
End: 2022-01-15

## 2022-09-05 ASSESSMENT — SLEEP AND FATIGUE QUESTIONNAIRES
HOW LIKELY ARE YOU TO NOD OFF OR FALL ASLEEP IN A CAR, WHILE STOPPED FOR A FEW MINUTES IN TRAFFIC: WOULD NEVER DOZE
HOW LIKELY ARE YOU TO NOD OFF OR FALL ASLEEP WHILE WATCHING TV: MODERATE CHANCE OF DOZING
HOW LIKELY ARE YOU TO NOD OFF OR FALL ASLEEP WHEN YOU ARE A PASSENGER IN A CAR FOR AN HOUR WITHOUT A BREAK: WOULD NEVER DOZE
HOW LIKELY ARE YOU TO NOD OFF OR FALL ASLEEP WHILE LYING DOWN TO REST IN THE AFTERNOON WHEN CIRCUMSTANCES PERMIT: HIGH CHANCE OF DOZING
HOW LIKELY ARE YOU TO NOD OFF OR FALL ASLEEP WHILE SITTING AND READING: HIGH CHANCE OF DOZING
HOW LIKELY ARE YOU TO NOD OFF OR FALL ASLEEP WHILE SITTING INACTIVE IN A PUBLIC PLACE: SLIGHT CHANCE OF DOZING
HOW LIKELY ARE YOU TO NOD OFF OR FALL ASLEEP WHILE SITTING AND TALKING TO SOMEONE: WOULD NEVER DOZE
HOW LIKELY ARE YOU TO NOD OFF OR FALL ASLEEP WHILE SITTING QUIETLY AFTER LUNCH WITHOUT ALCOHOL: MODERATE CHANCE OF DOZING

## 2022-09-06 ENCOUNTER — DOCUMENTATION ONLY (OUTPATIENT)
Dept: SLEEP MEDICINE | Facility: CLINIC | Age: 62
End: 2022-09-06

## 2022-09-06 NOTE — PROGRESS NOTES
WAS A WALK IN FOR MANUAL DOWNLOAD.  PT IS 57% COMPLIANT .  DOWNLOAD SCANNED INTO BT.  PER PT REQUEST DOWNLOAD FAXED TO DR BECKER [phone]

## 2022-09-07 ENCOUNTER — VIRTUAL VISIT (OUTPATIENT)
Dept: SLEEP MEDICINE | Facility: CLINIC | Age: 62
End: 2022-09-07
Payer: COMMERCIAL

## 2022-09-07 VITALS — BODY MASS INDEX: 42.59 KG/M2 | WEIGHT: 265 LBS | HEIGHT: 66 IN

## 2022-09-07 DIAGNOSIS — G47.33 OBSTRUCTIVE SLEEP APNEA: Primary | ICD-10-CM

## 2022-09-07 PROCEDURE — 99203 OFFICE O/P NEW LOW 30 MIN: CPT | Mod: 95 | Performed by: INTERNAL MEDICINE

## 2022-09-07 NOTE — PATIENT INSTRUCTIONS
Equipment Instructions    We will process your PAP order and send it to a Durable Medical Equipment (DME) provider.    The medical equipment company should call you within 7 days.  If you have not heard from the company, please contact them to see if they received your order and are planning to call you.    Please call us at [phone] if you are unable to contact the medical equipment company or if they do not have the order.    If you are starting a new PAP machine, please call us after you use it the first night to let us know how it went. This call also helps us know that you received your equipment and that everything is ready. Please use our central phone number [phone]    Contact information for [employer] company:    [employer] [facility] Tel: [phone]

## 2022-09-07 NOTE — PROGRESS NOTES
Zamzam is a 61 year old who is being evaluated via a billable video visit.      How would you like to obtain your AVS? MyChart  If the video visit is dropped, the invitation should be resent by: Text to cell phone: [phone]  Will anyone else be joining your video visit? No    Video-Visit Details    Video Start Time: 10:07 AM    Type of service:  Video Visit    Video End Time:10:27 AM    Originating Location (pt. Location): Home    Distant Location (provider location):  [facility] [place]     Platform used for Video Visit: [employer] and [employer]    Video was fading in and out using both platforms.    Priscilla Vicente    Additional 15 minutes on the date of service was spent performing the following:    -Preparing to see the patient  -Obtaining and/or reviewing separately obtained history   -Ordering medications, tests, or procedures   -Documenting clinical information in the electronic or other health record     Thank you for the opportunity to participate in the care of Zamzam Willis.     She is a 61 year old y/o female patient who comes to the sleep medicine clinic for follow up.  The patient was diagnosed with ZA on 08/18/14 (AHI=32.4). The patient would like to re-establish care so she can get a prescription to get a travel size machine. She states that she is still benefiting from CPAP usage.     Assessment and Plan:  In summary Zamzam Willis is a 61 year old year old female who is here for follow up.    1. Obstructive sleep apnea  I will write a prescription for the patient to receive a new CPAP machine.  - COMPREHENSIVE DME    Compliance Download data for 30 Days:  Pressure setting:APAP 8-13 cwp  95% pressure:11.7 cwp  Residual AHI:4.6 events per hour  Leak:Minimal  Compliance:57%  Mask Tolerance:Good  Skin irritation:None  DME:[facility]    Lab reviewed: Discussed with patient.    Sleep-Wake Cycle:    The patient likes to initiate sleep at around 10:30-11 PM with a sleep  latency of 5-10 minutes. The patient has 1-2 nocturnal awakenings. Final wake up time is around 6:15 AM.    AUDREY:  AUDREY Total Score: 17  Total score - Bunch: 11 (9/5/2022  7:07 PM)        Patient Active Problem List   Diagnosis     Major depressive disorder, single episode     GERD (gastroesophageal reflux disease)     Anxiety     Seasonal affective disorder (H)     Vitamin D deficiency     CARDIOVASCULAR SCREENING; LDL GOAL LESS THAN 160     Folliculitis     SEVERE ZA (obstructive sleep apnea)       Past Medical History:   Diagnosis Date     Anxiety      GERD (gastroesophageal reflux disease)      Major depressive disorder, single episode, unspecified      Migraine      Myalgia      ZA (obstructive sleep apnea) 8/14/2014    AHI 32, Oxygen Andrey 80%     Polymyalgia (H)        Past Surgical History:   Procedure Laterality Date     ABDOMEN SURGERY  1981,1983.1985    3 c-sections     BIOPSY      On chest area     C/SECTION, LOW TRANSVERSE      x 3     EYE SURGERY  2011    bilateral catarract      TONSILLECTOMY & ADENOIDECTOMY      as a child for snoring       Current Outpatient Medications   Medication Sig Dispense Refill     Acetaminophen (ACETAMIN PO) Take  by mouth as needed.       aspirin-acetaminophen-caffeine (EXCEDRIN MIGRAINE) 250-250-65 MG per tablet Take 1 tablet by mouth every 6 hours as needed for headaches 80 tablet      CALCIUM 600+D 600-400 MG-UNIT OR TABS take one tablet twice a day 180 Tab 3     cholecalciferol (VITAMIN D) 1000 UNIT tablet Take 1 tablet by mouth daily.       escitalopram (LEXAPRO) 20 MG tablet Take 1 tablet (20 mg) by mouth daily APPT NEEDED FOR FURTHER REFILLS 30 tablet 0     fluticasone (FLONASE) 50 MCG/ACT spray Spray 1-2 sprays into both nostrils daily 1 g 11     gabapentin (NEURONTIN) 300 MG capsule Take 1 tablet (300 mg) every night for 1-3 days, then 1 tablet twice daily for 1-3 days, then 1 tablet three times daily 90 capsule 0     HYDROcodone-acetaminophen (NORCO) 5-325 MG  per tablet TK 1 T PO Q 6 H PRN P  0     HYDROcodone-acetaminophen (NORCO) 5-325 MG per tablet Take 1 tablet by mouth every 6 hours as needed for moderate to severe pain or pain 15 tablet 0     Ibuprofen (IBU PO) Take  by mouth as needed.       MAGNESIUM 500 MG OR CAPS 1 TABLET DAILY       methylPREDNISolone (MEDROL DOSEPAK) 4 MG tablet Follow package instructions 21 tablet 0     MULTIVITAMIN OR 1 TABLET DAILY       nystatin-triamcinolone (MYCOLOG II) cream Apply topically 2 times daily as needed 15 g 0     omeprazole (PRILOSEC) 20 MG CR capsule Take 1 capsule (20 mg) by mouth daily 90 capsule 3     ORDER FOR DME Res Med S9 auto CPAP9-13 cm H2O, Grant Fx best medium         Allergies   Allergen Reactions     Sulfa Drugs Hives       Physical Exam:  GEN: NAD,  Psych: normal mood, normal affect      Patient verbalized understanding of these issues, agrees with the plan and all questions were answered today. Patient was given an opportuntity to voice any other symptoms or concerns not listed above. Patient did not have any other symptoms or concerns.      Juan Carlos Bella DO  Board Certified in Internal Medicine and Sleep Medicine    (Note created with Dragon voice recognition and unintended spelling errors and word substitutions may occur)     Audio and visual devices were used for this virtual clinic visit with permission from patient.

## 2022-09-07 NOTE — NURSING NOTE
Flu vaccine declined. Patient declined individual allergy and medication review by support staff because - Pt. reviewed during eCheck in.

## 2022-09-14 ENCOUNTER — TELEPHONE (OUTPATIENT)
Dept: SLEEP MEDICINE | Facility: CLINIC | Age: 62
End: 2022-09-14

## 2022-09-14 NOTE — TELEPHONE ENCOUNTER
Received order for a new CPAP. Provider noted on 9/7/22 sleep follow up notes that patient is interested in a travel CPAP machine. Left message letting patient know we only have one Airmini left as of today and to return call if she wants to purchase it.

## 2022-09-16 ENCOUNTER — TELEPHONE (OUTPATIENT)
Dept: SLEEP MEDICINE | Facility: CLINIC | Age: 62
End: 2022-09-16

## 2022-09-16 NOTE — TELEPHONE ENCOUNTER
PT TAMERA CALL AND STATED SHE WOULD LIKE TO PICKUP MACHINE IN [place]. TOLD HER I WILL GET IT ON THE TRUCK AND HAVE AVELINA CALL HER ONCE SHE GETS IT AND HAS IT SET.

## 2022-09-16 NOTE — TELEPHONE ENCOUNTER
MICHELLEM FOR PT LETTING HER KNOW WE HAVE ONLY ONE AIRMINI LEFT AND SHE CAN CALL ME DIRECTLY TO FIGURE OUT WHERE SHE WANTS TO GO TO PICK IT UP AND I WILL GET IT SENT OUT TO THAT LOCATION.

## 2022-10-17 ENCOUNTER — DOCUMENTATION ONLY (OUTPATIENT)
Dept: SLEEP MEDICINE | Facility: CLINIC | Age: 62
End: 2022-10-17

## 2022-10-17 NOTE — PROGRESS NOTES
10/17/2022- SRIDHAR- SPOKE WITH YONI AND SHE STATED SHE DOESN'T WANT TRAVEL CPAP ANYMORE BECAUSE OF NOISE. SHE WANTS REPLACEMENT CPAP. I EXPLAINED AFTER I GET ALL DOCUMENTS AND VERIFY IN , I WILL CALL TO SCHEDULE IN [place].

## 2022-10-19 ENCOUNTER — DOCUMENTATION ONLY (OUTPATIENT)
Dept: SLEEP MEDICINE | Facility: CLINIC | Age: 62
End: 2022-10-19

## 2022-10-20 ENCOUNTER — DOCUMENTATION ONLY (OUTPATIENT)
Dept: SLEEP MEDICINE | Facility: CLINIC | Age: 62
End: 2022-10-20
Payer: COMMERCIAL

## 2022-10-20 DIAGNOSIS — G47.33 OSA (OBSTRUCTIVE SLEEP APNEA): Primary | ICD-10-CM

## 2022-10-20 NOTE — PROGRESS NOTES
Patient was offered choice of vendor and chose [facility].  Patient Zamzam Willis was set up at [place]  on October 20, 2022. Patient received a Resmed Airsense 11 Pressures were set at 8-13 cm H2O.   Patient s ramp is 5 cm H2O for Auto and FLEX/EPR is 2.  Patient received a Resmed Mask name: JARRETT FX VINOD  Pillow mask size Small, heated tubing and heated humidifier.  Patient does need to meet compliance. Patient has a follow up on TBD with Dr. Bella.    Lilian Devi

## 2022-10-24 ENCOUNTER — DOCUMENTATION ONLY (OUTPATIENT)
Dept: SLEEP MEDICINE | Facility: CLINIC | Age: 62
End: 2022-10-24
Payer: COMMERCIAL

## 2022-10-24 NOTE — PROGRESS NOTES
3 day Sleep therapy management telephone visit    Diagnostic AHI:  32.4 HST    Confirmed with patient at time of call- N/A Patient is still interested in STM service       Message left for patient to return call    Order settings:  CPAP MIN CPAP MAX   8 cm H2O 13 cm H2O       Device settings:  CPAP MIN CPAP MAX EPR RESMED SOFT RESPONSE SETTING   8.0 cm  H20 13.0 cm  H20 TWO OFF           Assessment: Nightly usage over four hours      Action plan: Patient to have 14 day STM visit. Patient has a follow up visit scheduled:   no, but is required by insurance for compliance    Replacement device: No  STM ordered by provider: Yes     Total time spent on accessing and  interpreting remote patient PAP therapy data  10 minutes    Total time spent counseling, coaching  and reviewing PAP therapy data with patient  1 minutes    [id number] no

## 2022-11-04 ENCOUNTER — DOCUMENTATION ONLY (OUTPATIENT)
Dept: SLEEP MEDICINE | Facility: CLINIC | Age: 62
End: 2022-11-04
Payer: COMMERCIAL

## 2022-11-04 NOTE — PROGRESS NOTES
14  DAY STM VISIT    Diagnostic AHI:  32.4 HST    Data only recheck     Assessment: Pt meeting objective benchmarks.       Action plan: pt to have 30 day STM visit.      Device type: Auto-CPAP    PAP settings: CPAP min 8.0 cm  H20       CPAP max 13.0 cm  H20      95th% pressure 12.1 cm  H20        RESMED EPR level Setting: TWO    RESMED Soft response setting:  OFF    Mask type:  Nasal Mask    Objective measures: 14 day rolling measures      Compliance  100 %      Leak  20.64  lpm  last  upload      AHI 2.86   last  upload      Average number of minutes 463      Objective measure goal  Compliance   Goal >70%  Leak   Goal < 24 lpm  AHI  Goal < 5  Usage  Goal >240        Total time spent on accessing and interpreting remote patient PAP therapy data  10 minutes    Total time spent counseling, coaching  and reviewing PAP therapy data with patient  0 minutes    [id number]  [id number]  no (3 day STM)

## 2022-11-28 ASSESSMENT — SLEEP AND FATIGUE QUESTIONNAIRES
HOW LIKELY ARE YOU TO NOD OFF OR FALL ASLEEP WHILE SITTING INACTIVE IN A PUBLIC PLACE: WOULD NEVER DOZE
HOW LIKELY ARE YOU TO NOD OFF OR FALL ASLEEP WHILE WATCHING TV: MODERATE CHANCE OF DOZING
HOW LIKELY ARE YOU TO NOD OFF OR FALL ASLEEP IN A CAR, WHILE STOPPED FOR A FEW MINUTES IN TRAFFIC: WOULD NEVER DOZE
HOW LIKELY ARE YOU TO NOD OFF OR FALL ASLEEP WHILE LYING DOWN TO REST IN THE AFTERNOON WHEN CIRCUMSTANCES PERMIT: MODERATE CHANCE OF DOZING
HOW LIKELY ARE YOU TO NOD OFF OR FALL ASLEEP WHEN YOU ARE A PASSENGER IN A CAR FOR AN HOUR WITHOUT A BREAK: WOULD NEVER DOZE
HOW LIKELY ARE YOU TO NOD OFF OR FALL ASLEEP WHILE SITTING AND READING: SLIGHT CHANCE OF DOZING
HOW LIKELY ARE YOU TO NOD OFF OR FALL ASLEEP WHILE SITTING AND TALKING TO SOMEONE: WOULD NEVER DOZE
HOW LIKELY ARE YOU TO NOD OFF OR FALL ASLEEP WHILE SITTING QUIETLY AFTER LUNCH WITHOUT ALCOHOL: SLIGHT CHANCE OF DOZING

## 2022-11-29 ENCOUNTER — VIRTUAL VISIT (OUTPATIENT)
Dept: SLEEP MEDICINE | Facility: CLINIC | Age: 62
End: 2022-11-29
Payer: COMMERCIAL

## 2022-11-29 VITALS — BODY MASS INDEX: 42.59 KG/M2 | HEIGHT: 66 IN | WEIGHT: 265 LBS

## 2022-11-29 DIAGNOSIS — G47.33 OSA (OBSTRUCTIVE SLEEP APNEA): Primary | ICD-10-CM

## 2022-11-29 DIAGNOSIS — E66.01 MORBID OBESITY (H): ICD-10-CM

## 2022-11-29 PROCEDURE — 99213 OFFICE O/P EST LOW 20 MIN: CPT | Mod: 95 | Performed by: PHYSICIAN ASSISTANT

## 2022-11-29 NOTE — PROGRESS NOTES
"Zamzam Willis is a 62 year old female being evaluated via a billable telephone visit.     \"This telephone visit will be conducted via a call between you and your physician/provider. We have found that certain health care needs can be provided without the need for an in-person visit or physical exam.  This service lets us provide the care you need with a telephone conversation.  If a prescription is necessary we can send it directly to your pharmacy.  If lab work is needed we can place an order for that and you can then stop by our lab to have the test done at a later time.\"    Telephone visits are billed at different rates depending on your insurance coverage.  Please reach out to your insurance provider with any questions.    Patient has given verbal consent for  a Telephone visit? Yes    What telephone number would you like your provider to contact at at: [phone]    How would you like to obtain your AVS? MyChart      Telephone Visit Details:     Telephone Visit Start Time: 1:03 PM    Telephone Visit End Time:1:20 PM      Sleep Apnea - Follow-up Visit:    Impression/Plan:  Severe obstructive sleep apnea-  Excellent CPAP compliance and AHI is well controlled on CPAP 8-13 cm/H20. Daytime symptoms are improved.   Continue current treatment.   Comprehensive DME order placed.    Zamzam Willis will follow up in about 2 years or sooner if any concerns    22 minutes spent on day of encounter doing chart review,  history and exam, counseling, coordinating plan of care, documentation and further activities as noted above.      Gregorio Proctor PA-C      History of Present Illness:  Chief Complaint   Patient presents with     Video Visit     CPAP compliance follow up       Zamzam Willis presents for follow-up of their severe sleep apnea, managed with CPAP.     The patient was diagnosed with ZA on 08/18/14 (AHI=32.4). Last Sleep Medicine follow up was with Dr. Bella on 9/7/2022.     October 20, 2022. Patient received a " NEW Resmed Airsense 11 Pressures were set at 8-13 cm H2O.    Do you use a CPAP Machine at home: Yes  Overall, on a scale of 0-10 how would you rate your CPAP (0 poor, 10 great): 8    What type of mask do you use: Nasal Pillow  Is your mask comfortable: Yes  If not, why:      Is your mask leaking: No  If yes, where do you feel it:    How many night per week does the mask leak (0-7):      Do you notice snoring with mask on: No  Do you notice gasping arousals with mask on: No  Are you having significant oral or nasal dryness: No  Is the pressure setting comfortable: Yes  If not, why:      What is your typical bedtime: 10:45  How long does it take you to go to sleep on PAP therapy: Not long  What time do you typically get out of bed for the day: 6:30  How many hours on average per night are you using PAP therapy: 8  How many hours are you sleeping per night: 8  Do you feel well rested in the morning: Yes    ResMed   Auto-PAP 8.0 - 13.0 cmH2O 30 day usage data:    90% of days with > 4 hours of use. 3/30 days with no use.   Average use 408 minutes per day.   95%ile Leak 16.33 L/min.   CPAP 95% pressure 12.3 cm.   AHI 3.16 events per hour.       EPWORTH SLEEPINESS SCALE      [place] Sleepiness Scale ( LUIZ Lamar  [id number]<br>ESS - [place]/English - Final version - 21 Nov 07 - [facility] [place].) 11/28/2022   Sitting and reading Slight chance of dozing   Watching TV Moderate chance of dozing   Sitting, inactive in a public place (e.g. a theatre or a meeting) Would never doze   As a passenger in a car for an hour without a break Would never doze   Lying down to rest in the afternoon when circumstances permit Moderate chance of dozing   Sitting and talking to someone Would never doze   Sitting quietly after a lunch without alcohol Slight chance of dozing   In a car, while stopped for a few minutes in traffic Would never doze   [place] Score (MC) 6   [place] Score (Sleep) 6       INSOMNIA SEVERITY INDEX (AUDREY)      Insomnia  "Severity Index (AUDREY) 11/28/2022   Difficulty falling asleep 1   Difficulty staying asleep 1   Problems waking up too early 0   How SATISFIED/DISSATISFIED are you with your CURRENT sleep pattern? 1   How NOTICEABLE to others do you think your sleep problem is in terms of impairing the quality of your life? 2   How WORRIED/DISTRESSED are you about your current sleep problem? 1   To what extent do you consider your sleep problem to INTERFERE with your daily functioning (e.g. daytime fatigue, mood, ability to function at work/daily chores, concentration, memory, mood, etc.) CURRENTLY? 1   AUDREY Total Score 7       Guidelines for Scoring/Interpretation:  Total score categories:  0-7 = No clinically significant insomnia   8-14 = Subthreshold insomnia   15-21 = Clinical insomnia (moderate severity)  22-28 = Clinical insomnia (severe)  Used via courtesy of www.AskUth.va.gov with permission from Car Groves PhD., [facility]        Past medical/surgical history, family history, social history, medications and allergies were reviewed.        Problem List:  Patient Active Problem List    Diagnosis Date Noted     SEVERE ZA (obstructive sleep apnea) 08/14/2014     Priority: Medium     [place] 8/14/2014 HST AHI 32.4, Oxygen Andrey 80% CPAP        Folliculitis 02/24/2012     Priority: Medium     CARDIOVASCULAR SCREENING; LDL GOAL LESS THAN 160 10/31/2010     Priority: Medium     Vitamin D deficiency 06/22/2010     Priority: Medium     (Problem list name updated by automated process. Provider to review and confirm.)       Seasonal affective disorder (H) 03/03/2010     Priority: Medium     Major depressive disorder, single episode      Priority: Medium     Problem list name updated by automated process. Provider to review       GERD (gastroesophageal reflux disease)      Priority: Medium     Anxiety      Priority: Medium        Ht 1.664 m (5' 5.5\")   Wt 120.2 kg (265 lb)   LMP 10/12/2012   BMI 43.43 kg/m    "

## 2022-11-29 NOTE — NURSING NOTE
Chief Complaint   Patient presents with     Video Visit     CPAP compliance follow up       Patient confirms medications and allergies are accurate via patients echeck in completion, and or denies any changes since last reviewed/verified.     Laurel Rivera, Virtual Facilitator/LPN

## 2023-01-04 NOTE — NURSING NOTE
DME orders have been automatically faxed to [facility]. 2 year appointment reminder will be sent via My Chart.   Nieves Madera CMA

## 2023-03-29 ENCOUNTER — ANCILLARY PROCEDURE (OUTPATIENT)
Dept: GENERAL RADIOLOGY | Facility: CLINIC | Age: 63
End: 2023-03-29
Attending: NURSE PRACTITIONER
Payer: COMMERCIAL

## 2023-03-29 ENCOUNTER — OFFICE VISIT (OUTPATIENT)
Dept: URGENT CARE | Facility: URGENT CARE | Age: 63
End: 2023-03-29
Payer: COMMERCIAL

## 2023-03-29 VITALS
DIASTOLIC BLOOD PRESSURE: 86 MMHG | OXYGEN SATURATION: 98 % | HEART RATE: 101 BPM | BODY MASS INDEX: 47.03 KG/M2 | WEIGHT: 287 LBS | SYSTOLIC BLOOD PRESSURE: 131 MMHG | TEMPERATURE: 99.1 F

## 2023-03-29 DIAGNOSIS — J22 LOWER RESPIRATORY TRACT INFECTION: Primary | ICD-10-CM

## 2023-03-29 DIAGNOSIS — J02.9 SORE THROAT: ICD-10-CM

## 2023-03-29 DIAGNOSIS — R05.9 COUGH IN ADULT: ICD-10-CM

## 2023-03-29 LAB
DEPRECATED S PYO AG THROAT QL EIA: NEGATIVE
FLUAV AG SPEC QL IA: NEGATIVE
FLUBV AG SPEC QL IA: NEGATIVE

## 2023-03-29 PROCEDURE — 87804 INFLUENZA ASSAY W/OPTIC: CPT | Performed by: NURSE PRACTITIONER

## 2023-03-29 PROCEDURE — 87651 STREP A DNA AMP PROBE: CPT | Performed by: NURSE PRACTITIONER

## 2023-03-29 PROCEDURE — U0005 INFEC AGEN DETEC AMPLI PROBE: HCPCS | Performed by: NURSE PRACTITIONER

## 2023-03-29 PROCEDURE — 99213 OFFICE O/P EST LOW 20 MIN: CPT | Mod: CS | Performed by: NURSE PRACTITIONER

## 2023-03-29 PROCEDURE — U0003 INFECTIOUS AGENT DETECTION BY NUCLEIC ACID (DNA OR RNA); SEVERE ACUTE RESPIRATORY SYNDROME CORONAVIRUS 2 (SARS-COV-2) (CORONAVIRUS DISEASE [COVID-19]), AMPLIFIED PROBE TECHNIQUE, MAKING USE OF HIGH THROUGHPUT TECHNOLOGIES AS DESCRIBED BY CMS-2020-01-R: HCPCS | Performed by: NURSE PRACTITIONER

## 2023-03-29 PROCEDURE — 71046 X-RAY EXAM CHEST 2 VIEWS: CPT | Mod: TC | Performed by: RADIOLOGY

## 2023-03-29 RX ORDER — PREDNISONE 20 MG/1
20 TABLET ORAL DAILY
Qty: 5 TABLET | Refills: 0 | Status: SHIPPED | OUTPATIENT
Start: 2023-03-29 | End: 2023-04-03

## 2023-03-29 RX ORDER — DOXYCYCLINE 100 MG/1
100 CAPSULE ORAL 2 TIMES DAILY
Qty: 20 CAPSULE | Refills: 0 | Status: SHIPPED | OUTPATIENT
Start: 2023-03-29 | End: 2023-04-08

## 2023-03-29 NOTE — PROGRESS NOTES
SUBJECTIVE:   Zamzam Willis is a 62 year old female presenting with a chief complaint of cough - productive, ear pain bilateral, sore throat and facial pain/pressure.  Onset of symptoms was 4 day(s) ago.  Course of illness is worsening.    Severity moderate  Treatment measures tried include Tylenol/Ibuprofen and Decongestants.  Predisposing factors include ill contact: Work.    Past Medical History:   Diagnosis Date     Anxiety      GERD (gastroesophageal reflux disease)      Major depressive disorder, single episode, unspecified      Migraine      Myalgia      ZA (obstructive sleep apnea) 8/14/2014    AHI 32, Oxygen Andrey 80%     Polymyalgia (H)      Current Outpatient Medications   Medication Sig Dispense Refill     aspirin-acetaminophen-caffeine (EXCEDRIN MIGRAINE) 250-250-65 MG per tablet Take 1 tablet by mouth every 6 hours as needed for headaches 80 tablet      CALCIUM 600+D 600-400 MG-UNIT OR TABS take one tablet twice a day 180 Tab 3     cholecalciferol (VITAMIN D) 1000 UNIT tablet Take 1 tablet by mouth daily.       doxycycline hyclate (VIBRAMYCIN) 100 MG capsule Take 1 capsule (100 mg) by mouth 2 times daily for 10 days 20 capsule 0     escitalopram (LEXAPRO) 20 MG tablet Take 1 tablet (20 mg) by mouth daily APPT NEEDED FOR FURTHER REFILLS 30 tablet 0     Ibuprofen (IBU PO) Take  by mouth as needed.       MAGNESIUM 500 MG OR CAPS 1 TABLET DAILY       MULTIVITAMIN OR 1 TABLET DAILY       predniSONE (DELTASONE) 20 MG tablet Take 1 tablet (20 mg) by mouth daily for 5 days 5 tablet 0     Acetaminophen (ACETAMIN PO) Take  by mouth as needed.       nystatin-triamcinolone (MYCOLOG II) cream Apply topically 2 times daily as needed (Patient not taking: Reported on 3/29/2023) 15 g 0     ORDER FOR DME Res Med S9 auto CPAP9-13 cm H2O, [place] Fx best medium       Social History     Tobacco Use     Smoking status: Never     Smokeless tobacco: Never     Tobacco comments:     nonsmoking household   Substance Use Topics      Alcohol use: Yes     Alcohol/week: 0.0 standard drinks     Comment: Rarely         OBJECTIVE:  /86 (BP Location: Right arm, Cuff Size: Adult Large)   Pulse 101   Temp 99.1  F (37.3  C) (Tympanic)   Wt 130.2 kg (287 lb)   LMP 10/12/2012   SpO2 98%   BMI 47.03 kg/m    GENERAL APPEARANCE: healthy, alert and no distress  EYES: EOMI,  PERRL, conjunctiva clear  HENT: ear canals and TM's normal.  Nose and mouth without ulcers, erythema or lesions  NECK: supple, nontender, no lymphadenopathy  RESP:  no rales,+ rhonchi or no wheezes  CV: regular rates and rhythm, normal S1 S2, no murmur noted  ABDOMEN:  soft, nontender, no HSM or masses and bowel sounds normal  NEURO: Normal strength and tone, sensory exam grossly normal,  normal speech and mentation  SKIN: no suspicious lesions or rashes    Strep- Negative  Influenza negative  Covid pending.    ASSESSMENT:  Lower respiratory tract infection  Cough  Sore throat    PLAN:  1) Increase fluids and rest  2) Try Mucinex and Neti pot over the counter for congestion  3) Continue taking Tylenol/Ibuprofen for fever/pain relief as needed.  4) Salt water gargles and lozenges can be helpful for throat relief  5) You will only be notified of the confirmatory strep results if they are positive.    Follow up in 4-6 weeks for repeat Xray.

## 2023-03-30 LAB
GROUP A STREP BY PCR: NOT DETECTED
SARS-COV-2 RNA RESP QL NAA+PROBE: NEGATIVE

## 2023-03-30 NOTE — PATIENT INSTRUCTIONS
1) Increase fluids and rest  2) Try Mucinex and Neti pot over the counter for congestion  3) Continue taking Tylenol/Ibuprofen for fever/pain relief as needed.  4) Salt water gargles and lozenges can be helpful for throat relief  5) You will only be notified of the confirmatory strep results if they are positive.    Follow up in 4-6 weeks for repeat Xray.

## 2023-04-22 ENCOUNTER — HEALTH MAINTENANCE LETTER (OUTPATIENT)
Age: 63
End: 2023-04-22

## 2023-05-04 ENCOUNTER — OFFICE VISIT (OUTPATIENT)
Dept: FAMILY MEDICINE | Facility: CLINIC | Age: 63
End: 2023-05-04
Payer: COMMERCIAL

## 2023-05-04 ENCOUNTER — ANCILLARY PROCEDURE (OUTPATIENT)
Dept: GENERAL RADIOLOGY | Facility: CLINIC | Age: 63
End: 2023-05-04
Attending: FAMILY MEDICINE
Payer: COMMERCIAL

## 2023-05-04 VITALS
DIASTOLIC BLOOD PRESSURE: 76 MMHG | BODY MASS INDEX: 45.48 KG/M2 | TEMPERATURE: 98.3 F | RESPIRATION RATE: 16 BRPM | HEIGHT: 66 IN | HEART RATE: 81 BPM | WEIGHT: 283 LBS | SYSTOLIC BLOOD PRESSURE: 117 MMHG | OXYGEN SATURATION: 95 %

## 2023-05-04 DIAGNOSIS — R93.89 ABNORMAL CHEST X-RAY: ICD-10-CM

## 2023-05-04 DIAGNOSIS — R93.89 ABNORMAL CHEST X-RAY: Primary | ICD-10-CM

## 2023-05-04 PROCEDURE — 71046 X-RAY EXAM CHEST 2 VIEWS: CPT | Mod: TC | Performed by: RADIOLOGY

## 2023-05-04 PROCEDURE — 99213 OFFICE O/P EST LOW 20 MIN: CPT | Performed by: FAMILY MEDICINE

## 2023-05-04 RX ORDER — BUPROPION HYDROCHLORIDE 150 MG/1
TABLET ORAL
COMMUNITY
Start: 2023-04-02

## 2023-05-04 ASSESSMENT — PATIENT HEALTH QUESTIONNAIRE - PHQ9
10. IF YOU CHECKED OFF ANY PROBLEMS, HOW DIFFICULT HAVE THESE PROBLEMS MADE IT FOR YOU TO DO YOUR WORK, TAKE CARE OF THINGS AT HOME, OR GET ALONG WITH OTHER PEOPLE: SOMEWHAT DIFFICULT
SUM OF ALL RESPONSES TO PHQ QUESTIONS 1-9: 7
SUM OF ALL RESPONSES TO PHQ QUESTIONS 1-9: 7

## 2023-05-04 ASSESSMENT — PAIN SCALES - GENERAL: PAINLEVEL: NO PAIN (0)

## 2023-05-04 NOTE — PROGRESS NOTES
"  Assessment & Plan   (R93.89) Abnormal chest x-ray  (primary encounter diagnosis)  Comment: right lobe opacity resolved,  Plan: XR Chest 2 Views        Await radiology interpretation, consider CT if left lobe finding not resolved.                MED REC REQUIRED  Post Medication Reconciliation Status: discharge medications reconciled, continue medications without change  BMI:   Estimated body mass index is 46.38 kg/m  as calculated from the following:    Height as of this encounter: 1.664 m (5' 5.5\").    Weight as of this encounter: 128.4 kg (283 lb).   Weight management plan: Discussed healthy diet and exercise guidelines    See Patient Instructions    Naida Garrett MD  [facility] MARVIN Wyman is a 62 year old, presenting for the following health issues:  Urgent Care (Follow up)         View : No data to display.              [facility]     ED/UC Followup:    Facility:  [facility]   Date of visit: 03/29/2023  Reason for visit: Cough, Sinus, Otalgia, Pharyngitis   Current Status: Still has a cough and feels heaviness. Still coughing up some mucous     Overall improving and feeling better.    Needs follow-up cxr        Review of Systems   Constitutional, HEENT, cardiovascular, pulmonary, gi and gu systems are negative, except as otherwise noted.      Objective    /76   Pulse 81   Temp 98.3  F (36.8  C) (Tympanic)   Resp 16   Ht 1.664 m (5' 5.5\")   Wt 128.4 kg (283 lb)   LMP 10/12/2012   SpO2 95%   BMI 46.38 kg/m    Body mass index is 46.38 kg/m .  Physical Exam   GENERAL: healthy, alert and no distress  EYES: Eyes grossly normal to inspection, PERRL and conjunctivae and sclerae normal  RESP: lungs clear to auscultation - no rales, rhonchi or wheezes  CV: regular rate and rhythm, normal S1 S2, no S3 or S4, no murmur, click or rub, no peripheral edema and peripheral pulses strong  MS: no gross musculoskeletal defects noted, no edema  SKIN: no suspicious lesions or rashes  PSYCH: " mentation appears normal, affect normal/bright    Cxr: right lower lobe infiltrate resolved. Uncertain regarding nodular opacity on left as uncertain on original film  personally reviewed during visit, await radiology review                 Answers for HPI/ROS submitted by the patient on 5/4/2023  If you checked off any problems, how difficult have these problems made it for you to do your work, take care of things at home, or get along with other people?: Somewhat difficult  PHQ9 TOTAL SCORE: 7

## 2023-07-15 ENCOUNTER — HEALTH MAINTENANCE LETTER (OUTPATIENT)
Age: 63
End: 2023-07-15

## 2023-11-16 ENCOUNTER — TELEPHONE (OUTPATIENT)
Dept: FAMILY MEDICINE | Facility: CLINIC | Age: 63
End: 2023-11-16
Payer: COMMERCIAL

## 2023-11-16 NOTE — TELEPHONE ENCOUNTER
Patient Quality Outreach    Patient is due for the following:   Breast Cancer Screening - Mammogram  Depression  -  PHQ-9 needed  Physical Preventive Adult Physical    Next Steps:   Schedule a Adult Preventative    Type of outreach:    Sent [employer] message.      Questions for provider review:    None           Marilin Chavarria, CMA

## 2023-12-02 ENCOUNTER — HEALTH MAINTENANCE LETTER (OUTPATIENT)
Age: 63
End: 2023-12-02

## 2024-09-07 ENCOUNTER — HEALTH MAINTENANCE LETTER (OUTPATIENT)
Age: 64
End: 2024-09-07